# Patient Record
Sex: MALE | Race: WHITE | NOT HISPANIC OR LATINO | Employment: FULL TIME | URBAN - METROPOLITAN AREA
[De-identification: names, ages, dates, MRNs, and addresses within clinical notes are randomized per-mention and may not be internally consistent; named-entity substitution may affect disease eponyms.]

---

## 2017-01-25 ENCOUNTER — OFFICE VISIT (OUTPATIENT)
Dept: FAMILY MEDICINE | Facility: CLINIC | Age: 47
End: 2017-01-25
Payer: COMMERCIAL

## 2017-01-25 ENCOUNTER — TELEPHONE (OUTPATIENT)
Dept: FAMILY MEDICINE | Facility: CLINIC | Age: 47
End: 2017-01-25

## 2017-01-25 VITALS
OXYGEN SATURATION: 98 % | WEIGHT: 315 LBS | HEART RATE: 70 BPM | TEMPERATURE: 98 F | BODY MASS INDEX: 40.43 KG/M2 | SYSTOLIC BLOOD PRESSURE: 120 MMHG | HEIGHT: 74 IN | DIASTOLIC BLOOD PRESSURE: 80 MMHG

## 2017-01-25 DIAGNOSIS — M76.62 ACHILLES TENDINITIS OF BOTH LOWER EXTREMITIES: Primary | ICD-10-CM

## 2017-01-25 DIAGNOSIS — M76.61 ACHILLES TENDINITIS OF BOTH LOWER EXTREMITIES: Primary | ICD-10-CM

## 2017-01-25 DIAGNOSIS — E66.01 MORBID OBESITY DUE TO EXCESS CALORIES (H): ICD-10-CM

## 2017-01-25 PROCEDURE — 99213 OFFICE O/P EST LOW 20 MIN: CPT | Performed by: NURSE PRACTITIONER

## 2017-01-25 NOTE — PROGRESS NOTES
"  SUBJECTIVE:                                                    Jonathan Horn is a 46 year old male who presents to clinic today for the following health issues:      Musculoskeletal problem/pain      Duration: yrs     Description  Location: Achilles tendon bilateral     Intensity:  severe    Accompanying signs and symptoms: numbness    History  Previous similar problem: no   Previous evaluation:  none    Precipitating or alleviating factors:  Trauma or overuse: no   Aggravating factors include: relaxing to long tightens it     Therapies tried and outcome: walking boat, wraps     Patient is here with concerns of bilateral Achilles tenderness that has gradually worsened over the past few months. He works at [employer] in the [employer] department and walks for prolonged periods of time throughout the day. He has been diligent about changing his shoes throughout the shift to make sure that he's got good support throughout the day. He is concerned about worsening pain and possible rupture and irritation of the Achilles. He is well aware that his weight is affecting his feet. He has had issues in the past and has seen podiatry. He has been gently stretching his Achilles daily which has been helpful.         Problem list and histories reviewed & adjusted, as indicated.  Additional history: none    Problem list, Medication list, Allergies, and Medical/Social/Surgical histories reviewed in [facility] and updated as appropriate.    ROS:  Constitutional, HEENT, cardiovascular, pulmonary, gi and gu systems are negative, except as otherwise noted.    OBJECTIVE:                                                    /80 mmHg  Pulse 70  Temp(Src) 98  F (36.7  C) (Oral)  Ht 6' 2\" (1.88 m)  Wt 323 lb 1.6 oz (146.557 kg)  BMI 41.47 kg/m2  SpO2 98%  Body mass index is 41.47 kg/(m^2).  GENERAL:, alert, obese and uncomfortable  MS:  Pain in the left Achilles tendon at the insertion site. Good flexion with the Zabala test. " Tenderness in the right Achilles tendon with some pain in the heel.  SKIN: no suspicious lesions or rashes  PSYCH: mentation appears normal, affect normal/bright    none      ASSESSMENT/PLAN:                                                            1. Achilles tendinitis of both lower extremities   Will refer to Dr. Mehta for evaluation. We have discussed possible ultrasound but may actually benefit from injection or boot to prevent further injury.    2. Morbid obesity due to excess calories (H)   Discussed modifying caloric intake. Patient is actually very active walking throughout the day. Goal is to reduce weight as that will help with foot pain.      Follow-up as needed.    Carmela Stafford, NP  [facility]

## 2017-01-25 NOTE — NURSING NOTE
"Chief Complaint   Patient presents with     Musculoskeletal Problem       Initial /80 mmHg  Pulse 70  Temp(Src) 98  F (36.7  C) (Oral)  Ht 6' 2\" (1.88 m)  Wt 323 lb 1.6 oz (146.557 kg)  BMI 41.47 kg/m2  SpO2 98% Estimated body mass index is 41.47 kg/(m^2) as calculated from the following:    Height as of this encounter: 6' 2\" (1.88 m).    Weight as of this encounter: 323 lb 1.6 oz (146.557 kg).  BP completed using cuff size: large right arm   JACKY Miller      "

## 2017-01-25 NOTE — TELEPHONE ENCOUNTER
Patient is having bilateral achilles pain which is getting worse. Has seen you in the past. Tenderness on exam at the base of the achilles. Would an ultrasound be most helpful to see if partial tear or what would you recommend?    Thank you,     Carmela

## 2017-01-25 NOTE — MR AVS SNAPSHOT
"              After Visit Summary   1/25/2017    Jonathan Horn    MRN: [id number]           Patient Information     Date Of Birth          1970        Visit Information        Provider Department      1/25/2017 2:20 PM Carmela Stafford NP [facility]        Today's Diagnoses     Achilles tendon tear, left, initial encounter    -  1     Achilles tendon tear, right, initial encounter            Follow-ups after your visit        Who to contact     If you have questions or need follow up information about today's clinic visit or your schedule please contact [facility] directly at [phone].  Normal or non-critical lab and imaging results will be communicated to you by [email or web address], letter or phone within 4 business days after the clinic has received the results. If you do not hear from us within 7 days, please contact the clinic through Likezt or phone. If you have a critical or abnormal lab result, we will notify you by phone as soon as possible.  Submit refill requests through SingleHop or call your pharmacy and they will forward the refill request to us. Please allow 3 business days for your refill to be completed.          Additional Information About Your Visit        MyChart Information     SingleHop gives you secure access to your electronic health record. If you see a primary care provider, you can also send messages to your care team and make appointments. If you have questions, please call your primary care clinic.  If you do not have a primary care provider, please call [phone] and they will assist you.        Care EveryWhere ID     This is your Care EveryWhere ID. This could be used by other organizations to access your [place] medical records  [id number]        Your Vitals Were     Pulse Temperature Height BMI (Body Mass Index) Pulse Oximetry       70 98  F (36.7  C) (Oral) 6' 2\" (1.88 m) 41.47 kg/m2 98%        Blood Pressure from Last 3 Encounters:   01/25/17 " 120/80   06/14/16 106/77   05/16/16 128/84    Weight from Last 3 Encounters:   01/25/17 323 lb 1.6 oz (146.557 kg)   05/16/16 308 lb (139.708 kg)   05/13/15 309 lb (140.161 kg)              Today, you had the following     No orders found for display       Primary Care Provider Office Phone # Fax #    Joseph Rubio [phone]       [facility] [address]  [facility] [id number]        Thank you!     Thank you for choosing [facility]  for your care. Our goal is always to provide you with excellent care. Hearing back from our patients is one way we can continue to improve our services. Please take a few minutes to complete the written survey that you may receive in the mail after your visit with us. Thank you!             Your Updated Medication List - Protect others around you: Learn how to safely use, store and throw away your medicines at www.disposemymeds.org.          This list is accurate as of: 1/25/17  2:47 PM.  Always use your most recent med list.                   Brand Name Dispense Instructions for use    lisinopril-hydrochlorothiazide 20-12.5 MG per tablet    PRINZIDE/ZESTORETIC    90 tablet    Take 1 tablet by mouth daily       ZANTAC PO      Take 150 mg by mouth

## 2017-01-26 PROBLEM — M76.61 ACHILLES TENDINITIS OF BOTH LOWER EXTREMITIES: Status: ACTIVE | Noted: 2017-01-26

## 2017-01-26 PROBLEM — M76.62 ACHILLES TENDINITIS OF BOTH LOWER EXTREMITIES: Status: ACTIVE | Noted: 2017-01-26

## 2017-01-26 NOTE — TELEPHONE ENCOUNTER
Carmela requested RN to call patient and schedule him with pod.    Pt informed and scheduled  Angeli Randall RN, BSN

## 2017-01-27 ENCOUNTER — OFFICE VISIT (OUTPATIENT)
Dept: PODIATRY | Facility: CLINIC | Age: 47
End: 2017-01-27
Payer: COMMERCIAL

## 2017-01-27 VITALS — BODY MASS INDEX: 40.43 KG/M2 | WEIGHT: 315 LBS | HEIGHT: 74 IN

## 2017-01-27 DIAGNOSIS — M76.62 ACHILLES TENDONITIS, BILATERAL: Primary | ICD-10-CM

## 2017-01-27 DIAGNOSIS — M76.61 ACHILLES TENDONITIS, BILATERAL: Primary | ICD-10-CM

## 2017-01-27 PROCEDURE — 99243 OFF/OP CNSLTJ NEW/EST LOW 30: CPT | Performed by: PODIATRIST

## 2017-01-27 ASSESSMENT — PAIN SCALES - GENERAL: PAINLEVEL: MILD PAIN (2)

## 2017-01-27 NOTE — NURSING NOTE
"Chief Complaint   Patient presents with     Foot Problems     BL achilles tendon pain L x few months and R x recent       Initial Ht 6' 2\" (1.88 m)  Wt 323 lb (146.512 kg)  BMI 41.45 kg/m2 Estimated body mass index is 41.45 kg/(m^2) as calculated from the following:    Height as of this encounter: 6' 2\" (1.88 m).    Weight as of this encounter: 323 lb (146.512 kg).    Fabby Asencio CMA ([facility])  Podiatry/Foot & Ankle Surgery  [facility]      "

## 2017-01-27 NOTE — PATIENT INSTRUCTIONS
Follow Up - 4 weeks    [facility] Locations:         Monday Tuesday   [facility]   [address] [address] [address], [address]   [place] [address] [place] [address]   [phone]       Wednesday:  Surgery Day    Surgery Scheduling Line - [phone]       Thursday Morning Thursday Afternoon   [facility]   [address], [address]   [place] [address] [place] [address]   [phone]       Friday Morning To Schedule an Appointment    [facility] Call: [phone] [address] [place] [address]  [phone] PLEASE FAX ALL FORMS TO: [phone]     PLANTAR FASCIITIS   What is plantar fasciitis?   Plantar fasciitis is often referred to as heel spurs or heel pain. Plantar fasciitis is a very common problem that affects people of all foot shapes, age, weight and activity level. Pain may be in the arch or on the weight-bearing surface of the heel. The pain maycome on without injury or identifiable cause. Pain is generally present when first getting out of bed in the morning or up from a seated break.   What causes plantar fasciitis?   The plantar fascia is a dense fibrous band of tissue that stretches across the bottom surface of the foot. The fascia helps support the foot muscles and arch. Plantar fasciitis is thought to be caused by mechanical strain or overload. Frequent walking without shoes or wearing unsupportive shoes is thought to cause structural overload and ultimately inflammation of the plantar fascia. Some people have heel spurs that can be seen on x-ray. The heel spur is actually a minor component of plantar fascitis and is largely ignored.   How long will this last?   Plantar fasciitis can last from one day to a lifetime. Some people get intermittent fascitis that is very short-lived. Others suffer daily for  years. Excessive body weight, frequent bare foot walking, long hours on the feet, inadequate shoes, predisposing foot structures and excessive activity such as running are all potential issues that lead to chronic and/or recurring plantar fascitis. Having plantar fasciitis means that you are forever prone to this problem and will require modification of some of the above factors. Most people seek treatment within one to four months. Healing usually requires a similar one to four month time frame. Healing time is relative to the amount of effort spent treating the problem.   What can I do?   The easiest solution is to stop walking around your home without shoes. Plantar fasciitis is largely a shoe problem. Shoes are either not being worn often enough or your current shoes are inadequate for your weight, foot structure or activity level. The majority of shoes on the market today are not sufficient to resist development of plantar fasciitis or to promote healing. Assume that your current shoes are inadequate and will need to be replaced. Even high quality shoes wear out with 6 months to one year of frequent use. Weightloss is another option. Losing ten pounds in the next two months may be enough to resolve the problem. Ice applied to the area of pain two to three times per day for ten minutes each session can be very helpful. This should continue until the problem resolves. Achilles tendon stretching is essential. Stretch multiple times daily to promote healing and to prevent recurrence in the future.     What if this does not help?   Medical treatments often include custom arch supports, cortisone injections, physical therapy, splints to be worn in bed, prescription medications and surgery. The home treatments listed above will be necessary regardless of these advanced medical treatments. Surgery is rarely needed but is very helpful in selected cases.     Heel pain in my future?   Plantar fasciitis is highly  recurrent. Risk factors often continue, including return to bare foot walking, inadequate shoes, excessive body weight, excessive activities, etc. Your life style and foot structure may predispose you to recurrent plantar fasciitis. A daily prevention regimen can be very helpful. Ongoing use of shoe inserts, careful attention to appropriate shoes, daily Achilles stretching, etc. may prevent recurrence. Prompt attention at the earliest warning signs of heel pain can resolve the problem in as short as a few days.   Below are some exercises for Plantar fasciitis:  Stair exercise  You can step on the stairs with the ball of your foot and hold your position for at least 15 seconds, then slowly step down with the heels of your foot. You can do this daily and as often as you want. Plantar fasciitis exercise equipment and handout materials are useful in relieving pain.  Picking the towel  Plantar fasciitis exercise equipment and handout teach you how to exercise by picking the towel. You can sit comfortably and then pick the towel with your toes. Do this at least 10 to 20 times regularly. You can use any object other than a towel as long as the material can be soft and you can pick it up with your toes.  Rolling the bottle or ball  You can get a small ball or bottle and then roll it with your foot. Do this daily for at least 15 to 20 times. Plantar fasciitis exercise equipment and handout are very useful in treating the symptoms of the foot condition.  Stretching the calf  You could lie supine, raise one foot, and then point your toes towards the floor. Do this daily for at least 15 to 20 times. The calf is connected to the heel and the balls of the foot, so you should also exercise this also. Plantar fasciitis exercise equipment and handout usually mentions the importance of exercising the calf also.  Flex the toes  Sit comfortably and then flex your toes by pointing it towards the floor or towards your body. This will  relax and flex your foot and exercise your plantar fascia, the calf, and the Achilles tendon. The inability of the foot to stretch often causes the bunching up of the plantar fascia area leading to the pain.  Massaging the calf and the plantar fascia also helps a lot in alleviating the pain and preventing its recurrence. If you prefer standard plantar fasciitis exercise equipment and handout, then you can avail of this from legitimate sources. You can use the standard stretching device, the wheel, and the belt. These are all significant devices in treating the pain in the plantar fascia.    Therapies covered by Dr Mehta today:    1.  Supportive shoes, minimizing barefoot ambulation - helps to provide cushion, padding and support to the ligament that is inflamed.  Socks, flip flops, flats and some slippers are not typically sufficient to provide support.  Shoes should be worn even in-doors  2.  Insert/orthotics - inserts/orthotics that have an arch support built in to them provide further stress relief for the ligament.  3. Icing - using a frozen water bottle or orange, and rolling it along the bottom of the arch/heel can help to alleviate discomfort, and can act as a tissue massage to the painful, inflamed ligament.  There is evidence that shows icing at least three times daily can be beneficial  4.  Anti-inflammatory(NSAIDS)/Tylenol - anti-inflammatories, such as ibuprofen or Aleve, as well as Tylenol can be used to help decrease symptoms and improve pain levels.  If you have high blood pressure, heart disease, stomach or kidney problems, use anti-inflammatories sparingly.  Tylenol should not be used if you have liver problems.  If you can safely taken them, you can use NSAIDS and tylenol in combination for pain relief  5. Activity modifications - if there are certain things that you do, whether it's going barefoot or certain shoes/activities, you should try to minimize those activities as much as possible  "until your symptoms are sufficiently resolved.  Certainly, some activities, such as running on the treadmill, are easier to take a break from versus others, such as work or chores at home.  \"If there are certain activities that hurt your heel, and you keep doing those activities that hurt your heel, your heel will keep hurting\".    6.  Stretching - Stretching your Achilles and hamstring can help to decrease stress on the plantar fascia.                   Hold each stretch for 10 seconds.  Stretch 10 times per set, three sets per day.  Morning, afternoon and evening.  If your heel pain is very severe in the morning, consider doing the first set of stretches before you get out of bed.            If these initial therapies are insufficient, we have our tier 2 therapies that can more aggressively work to improve your symptoms and get you back to the activities that you enjoy.      TENDONITIS   Tendons are the strong fibrous portions ofmuscles that attach to bones and allow the muscle to move a joint when it contracts. Tendons are very strong because they have a lot of force exerted on them. Sometimes tendons can become painful because they have suffered an acute injury, in which too much force was exerted at one time, or an overuse injury, in which a normal force was exerted too frequently or over a prolonged period of time. As a result, there is damage to the tendon and its surrounding soft tissue structures and they become inflammed. Because tendons do not have a great blood supply, they do not heal rapidly and the inflammation can become chronic.   Conservative treatment for tendinitis involves rest and anti-inflammatory measures. Ice is applied 15 minutes 2-3 times daily. Anti-inflammatory medications called NSAIDs (ibuprofen, example) can be taken provided they are used with caution, as they can lead to internal bleeding and increase the risk ofstroke and heart attack. Sometimes topical nitroglycerin is prescribed " to help with pain. Often your doctor will use a special shoe or removable walking cast to immobilize the tendon, allowing it to heal without further damage from use. These devices are very useful in helping tendons heal, but they may slow you down or make you feel like your hip, knee, or back are out ofalignment. This is temporary and should go away once you are out ofthe immobilization. You should not use a walking cast when showering or driving. Another option is Platelet Rich Plasma injections. (Normally done with a Sports and Orthorapedic doctor.   If conservative measures fail, your physician may need to surgically repair the tendon by removing any chronic inflammatory tissue and sewing it back together. Sometimes it is sewn to an adjacent tendon with similar function for support and sometimes it is lengthened. . Sometimes the bones around the tendon need to be realigned or reshaped to better support the tendon or prevent further damage. Your foot and ankle surgeon will discuss the specifics of your surgery with you, should you need it.    Towel stretch: Sit on a hard surface with your injured leg stretched out in front of you. Loop a towel around your toes and the ball of your foot and pull the towel toward your body keeping your leg straight. Hold this position for 15 to 30 seconds and then relax. Repeat 3 times. Then push the towel away with the ball of your foot. Repeat 3 times.  When you don't feel much of a stretch using the towel, you can start the standing calf stretch and the following exercises.  Standing calf stretch: Stand facing a wall with your hands on the wall at about eye level. Keep your injured leg back with your heel on the floor. Keep the other leg forward with the knee bent. Turn your back foot slightly inward (as if you were pigeon-toed). Slowly lean into the wall until you feel a stretch in the back of your calf. Hold the stretch for 15 to 30 seconds. Return to the starting position.  Repeat 3 times. Do this exercise several times each day.   Standing soleus stretch: Stand facing a wall with your hands on the wall at about chest height. Keep your injured leg back with your heel on the floor. Keep the other leg forward with the knee bent. Turn your back foot slightly inward (as if you were pigeon-toed). Bend your back knee slightly and gently lean into the wall until you feel a stretch in the lower calf of your injured leg. Hold the stretch for 15 to 30 seconds. Return to the starting position. Repeat 3 times.   Achilles stretch: Stand with the ball of one foot on a stair. Reach for the step below with your heel until you feel a stretch in the arch of your foot. Hold this position for 15 to 30 seconds and then relax. Repeat 3 times.   Heel raise: Balance yourself while standing behind a chair or counter. Using the chair or counter as a support to help you, raise your body up onto your toes and hold for 5 seconds. Then slowly lower yourself down without holding onto the support. (It's OK to keep holding onto the support if you need to.) When this exercise becomes less painful, try lowering yourself down on the injured leg only. Repeat 15 times. Do 2 sets of 15. Rest 30 seconds between sets.   Step-up: Stand with the foot of your injured leg on a support 3 to 5 inches high (like a small step or block of wood). Keep your other foot flat on the floor. Shift your weight onto the injured leg on the support. Straighten your injured leg as the other leg comes off the floor. Return to the starting position by bending your injured leg and slowly lowering your uninjured leg back to the floor. Do 2 sets of 15.   Resisted ankle eversion: Sit with both legs stretched out in front of you, with your feet about a shoulder's width apart. Tie a loop in one end of elastic tubing. Put the foot of your injured leg through the loop so that the tubing goes around the arch of that foot and wraps around the outside of the  other foot. Hold onto the other end of the tubing with your hand to provide tension. Turn the foot of your injured leg up and out. Make sure you keep your other foot still so that it will allow the tubing to stretch as you move the foot of your injured leg. Return to the starting position. Do 2 sets of 15.   Balance and reach exercises: Stand next to a chair with your injured leg farther from the chair. The chair will provide support if you need it. Stand on the foot of your injured leg and bend your knee slightly. Try to raise the arch of this foot while keeping your big toe on the floor.   Keep your foot in this position. With the hand that is farther away from the chair, reach forward in front of you by bending at the waist. Avoid bending your knee any more as you do this. Repeat this 10 times. To make the exercise more challenging, reach farther in front of you. Do 2 sets of 10.    the same position as above. While keeping your arch height, reach the hand that is farther away from the chair across your body toward the chair. The farther you reach, the more challenging the exercise. Do 2 sets of 10.    Resisted ankle eversion: Sit with both legs stretched out in front of you, with your feet about a shoulder's width apart. Tie a loop in one end of elastic tubing. Put the foot of your injured leg through the loop so that the tubing goes around the arch of that foot and wraps around the outside of the other foot. Hold onto the other end of the tubing with your hand to provide tension. Turn the foot of your injured leg up and out. Make sure you keep your other foot still so that it will allow the tubing to stretch as you move the foot of your injured leg. Return to the starting position. Do 2 sets of 15.   If you have access to a wobble board, do the following exercises:  Wobble board exercises:   Stand on a wobble board with your feet shoulder width apart. Rock the board forwards and backwards 30 times, then  side to side 30 times. Hold on to a chair if you need support.   Rotate the wobble board around so that the edge of the board is in contact with the floor at all times. Do this 30 times in a clockwise and then a counterclockwise direction.   Balance on the wobble board for as long as you can without letting the edges touch the floor. Try to do this for 2 minutes without touching the floor.   Rotate the wobble board in clockwise and counterclockwise circles, but do not let the edge of the board touch the floor.   When you have mastered exercises A through D, try repeating them while standing on just your injured leg.   After you are able to do these exercises on one leg, try to do them with your eyes closed. Make sure you have something nearby to support you in case you lose your balance.    Over the Counter Inserts    Super Feet are the most common and easiest to find.    Locations include any [employer], [employer] in [place] on [address]  and in [place] on [address], [employer] in [place] on [address], [facility] Running Room in [place] on [address] [facility] Running Room in [place] on [address], [employer] in [place] on [address] and [employer] in [place] on [address] and in [place] on [address].    Sofsole Fit can be found at [employer] in [place].  You can also find them online at [email or web address]    Spenco can be found online and at [employer] in [place] on [address], [employer] in [facility] on [address], [employer] in [place] on [address], [employer] in [place] on [address] and [employer] in [place] on [address].    Power Step can be a little harder to find.  Locations include [employer] in [place] on [address], [place] in [place], [place] [employer] in [place] on [address] and online    Walk-Fit - [employer]     [facility]    **  A good high quality  over the counter insert can cost around $40-$50.

## 2017-01-27 NOTE — MR AVS SNAPSHOT
After Visit Summary   1/27/2017    Jonathan Horn    MRN: [id number]           Patient Information     Date Of Birth          1970        Visit Information        Provider Department      1/27/2017 10:30 AM Boni Mehta DPM [facility]        Care Instructions    Follow Up - 4 weeks    Dr. Mehta's [facility] Locations:         Monday Tuesday   [facility]   [address] [address], [address]   [place] [address] [place] [address]   [phone]       Wednesday:  Surgery Day    Surgery Scheduling Line - [phone]       Thursday Morning Thursday Afternoon   [facility]   [address], [address]   [place] [address] [place] [address]   [phone]       Friday Morning To Schedule an Appointment    [facility] Call: [phone] [address] [place] [address]  [phone] PLEASE FAX ALL FORMS TO: [phone]     PLANTAR FASCIITIS   What is plantar fasciitis?   Plantar fasciitis is often referred to as heel spurs or heel pain. Plantar fasciitis is a very common problem that affects people of all foot shapes, age, weight and activity level. Pain may be in the arch or on the weight-bearing surface of the heel. The pain maycome on without injury or identifiable cause. Pain is generally present when first getting out of bed in the morning or up from a seated break.   What causes plantar fasciitis?   The plantar fascia is a dense fibrous band of tissue that stretches across the bottom surface of the foot. The fascia helps support the foot muscles and arch. Plantar fasciitis is thought to be caused by mechanical strain or overload. Frequent walking without shoes or wearing unsupportive shoes is thought to cause structural overload and ultimately inflammation of the plantar fascia. Some  people have heel spurs that can be seen on x-ray. The heel spur is actually a minor component of plantar fascitis and is largely ignored.   How long will this last?   Plantar fasciitis can last from one day to a lifetime. Some people get intermittent fascitis that is very short-lived. Others suffer daily for years. Excessive body weight, frequent bare foot walking, long hours on the feet, inadequate shoes, predisposing foot structures and excessive activity such as running are all potential issues that lead to chronic and/or recurring plantar fascitis. Having plantar fasciitis means that you are forever prone to this problem and will require modification of some of the above factors. Most people seek treatment within one to four months. Healing usually requires a similar one to four month time frame. Healing time is relative to the amount of effort spent treating the problem.   What can I do?   The easiest solution is to stop walking around your home without shoes. Plantar fasciitis is largely a shoe problem. Shoes are either not being worn often enough or your current shoes are inadequate for your weight, foot structure or activity level. The majority of shoes on the market today are not sufficient to resist development of plantar fasciitis or to promote healing. Assume that your current shoes are inadequate and will need to be replaced. Even high quality shoes wear out with 6 months to one year of frequent use. Weightloss is another option. Losing ten pounds in the next two months may be enough to resolve the problem. Ice applied to the area of pain two to three times per day for ten minutes each session can be very helpful. This should continue until the problem resolves. Achilles tendon stretching is essential. Stretch multiple times daily to promote healing and to prevent recurrence in the future.     What if this does not help?   Medical treatments often include custom arch supports, cortisone injections,  physical therapy, splints to be worn in bed, prescription medications and surgery. The home treatments listed above will be necessary regardless of these advanced medical treatments. Surgery is rarely needed but is very helpful in selected cases.     Heel pain in my future?   Plantar fasciitis is highly recurrent. Risk factors often continue, including return to bare foot walking, inadequate shoes, excessive body weight, excessive activities, etc. Your life style and foot structure may predispose you to recurrent plantar fasciitis. A daily prevention regimen can be very helpful. Ongoing use of shoe inserts, careful attention to appropriate shoes, daily Achilles stretching, etc. may prevent recurrence. Prompt attention at the earliest warning signs of heel pain can resolve the problem in as short as a few days.   Below are some exercises for Plantar fasciitis:  Stair exercise  You can step on the stairs with the ball of your foot and hold your position for at least 15 seconds, then slowly step down with the heels of your foot. You can do this daily and as often as you want. Plantar fasciitis exercise equipment and handout materials are useful in relieving pain.  Picking the towel  Plantar fasciitis exercise equipment and handout teach you how to exercise by picking the towel. You can sit comfortably and then pick the towel with your toes. Do this at least 10 to 20 times regularly. You can use any object other than a towel as long as the material can be soft and you can pick it up with your toes.  Rolling the bottle or ball  You can get a small ball or bottle and then roll it with your foot. Do this daily for at least 15 to 20 times. Plantar fasciitis exercise equipment and handout are very useful in treating the symptoms of the foot condition.  Stretching the calf  You could lie supine, raise one foot, and then point your toes towards the floor. Do this daily for at least 15 to 20 times. The calf is connected to the  heel and the balls of the foot, so you should also exercise this also. Plantar fasciitis exercise equipment and handout usually mentions the importance of exercising the calf also.  Flex the toes  Sit comfortably and then flex your toes by pointing it towards the floor or towards your body. This will relax and flex your foot and exercise your plantar fascia, the calf, and the Achilles tendon. The inability of the foot to stretch often causes the bunching up of the plantar fascia area leading to the pain.  Massaging the calf and the plantar fascia also helps a lot in alleviating the pain and preventing its recurrence. If you prefer standard plantar fasciitis exercise equipment and handout, then you can avail of this from legitimate sources. You can use the standard stretching device, the wheel, and the belt. These are all significant devices in treating the pain in the plantar fascia.    Therapies covered by Dr Mehta today:    1.  Supportive shoes, minimizing barefoot ambulation - helps to provide cushion, padding and support to the ligament that is inflamed.  Socks, flip flops, flats and some slippers are not typically sufficient to provide support.  Shoes should be worn even in-doors  2.  Insert/orthotics - inserts/orthotics that have an arch support built in to them provide further stress relief for the ligament.  3. Icing - using a frozen water bottle or orange, and rolling it along the bottom of the arch/heel can help to alleviate discomfort, and can act as a tissue massage to the painful, inflamed ligament.  There is evidence that shows icing at least three times daily can be beneficial  4.  Anti-inflammatory(NSAIDS)/Tylenol - anti-inflammatories, such as ibuprofen or Aleve, as well as Tylenol can be used to help decrease symptoms and improve pain levels.  If you have high blood pressure, heart disease, stomach or kidney problems, use anti-inflammatories sparingly.  Tylenol should not be used if you have  "liver problems.  If you can safely taken them, you can use NSAIDS and tylenol in combination for pain relief  5. Activity modifications - if there are certain things that you do, whether it's going barefoot or certain shoes/activities, you should try to minimize those activities as much as possible until your symptoms are sufficiently resolved.  Certainly, some activities, such as running on the treadmill, are easier to take a break from versus others, such as work or chores at home.  \"If there are certain activities that hurt your heel, and you keep doing those activities that hurt your heel, your heel will keep hurting\".    6.  Stretching - Stretching your Achilles and hamstring can help to decrease stress on the plantar fascia.                   Hold each stretch for 10 seconds.  Stretch 10 times per set, three sets per day.  Morning, afternoon and evening.  If your heel pain is very severe in the morning, consider doing the first set of stretches before you get out of bed.            If these initial therapies are insufficient, we have our tier 2 therapies that can more aggressively work to improve your symptoms and get you back to the activities that you enjoy.      TENDONITIS   Tendons are the strong fibrous portions ofmuscles that attach to bones and allow the muscle to move a joint when it contracts. Tendons are very strong because they have a lot of force exerted on them. Sometimes tendons can become painful because they have suffered an acute injury, in which too much force was exerted at one time, or an overuse injury, in which a normal force was exerted too frequently or over a prolonged period of time. As a result, there is damage to the tendon and its surrounding soft tissue structures and they become inflammed. Because tendons do not have a great blood supply, they do not heal rapidly and the inflammation can become chronic.   Conservative treatment for tendinitis involves rest and anti-inflammatory " measures. Ice is applied 15 minutes 2-3 times daily. Anti-inflammatory medications called NSAIDs (ibuprofen, example) can be taken provided they are used with caution, as they can lead to internal bleeding and increase the risk ofstroke and heart attack. Sometimes topical nitroglycerin is prescribed to help with pain. Often your doctor will use a special shoe or removable walking cast to immobilize the tendon, allowing it to heal without further damage from use. These devices are very useful in helping tendons heal, but they may slow you down or make you feel like your hip, knee, or back are out ofalignment. This is temporary and should go away once you are out ofthe immobilization. You should not use a walking cast when showering or driving. Another option is Platelet Rich Plasma injections. (Normally done with a Sports and Orthorapedic doctor.   If conservative measures fail, your physician may need to surgically repair the tendon by removing any chronic inflammatory tissue and sewing it back together. Sometimes it is sewn to an adjacent tendon with similar function for support and sometimes it is lengthened. . Sometimes the bones around the tendon need to be realigned or reshaped to better support the tendon or prevent further damage. Your foot and ankle surgeon will discuss the specifics of your surgery with you, should you need it.    Towel stretch: Sit on a hard surface with your injured leg stretched out in front of you. Loop a towel around your toes and the ball of your foot and pull the towel toward your body keeping your leg straight. Hold this position for 15 to 30 seconds and then relax. Repeat 3 times. Then push the towel away with the ball of your foot. Repeat 3 times.  When you don't feel much of a stretch using the towel, you can start the standing calf stretch and the following exercises.  Standing calf stretch: Stand facing a wall with your hands on the wall at about eye level. Keep your injured  leg back with your heel on the floor. Keep the other leg forward with the knee bent. Turn your back foot slightly inward (as if you were pigeon-toed). Slowly lean into the wall until you feel a stretch in the back of your calf. Hold the stretch for 15 to 30 seconds. Return to the starting position. Repeat 3 times. Do this exercise several times each day.   Standing soleus stretch: Stand facing a wall with your hands on the wall at about chest height. Keep your injured leg back with your heel on the floor. Keep the other leg forward with the knee bent. Turn your back foot slightly inward (as if you were pigeon-toed). Bend your back knee slightly and gently lean into the wall until you feel a stretch in the lower calf of your injured leg. Hold the stretch for 15 to 30 seconds. Return to the starting position. Repeat 3 times.   Achilles stretch: Stand with the ball of one foot on a stair. Reach for the step below with your heel until you feel a stretch in the arch of your foot. Hold this position for 15 to 30 seconds and then relax. Repeat 3 times.   Heel raise: Balance yourself while standing behind a chair or counter. Using the chair or counter as a support to help you, raise your body up onto your toes and hold for 5 seconds. Then slowly lower yourself down without holding onto the support. (It's OK to keep holding onto the support if you need to.) When this exercise becomes less painful, try lowering yourself down on the injured leg only. Repeat 15 times. Do 2 sets of 15. Rest 30 seconds between sets.   Step-up: Stand with the foot of your injured leg on a support 3 to 5 inches high (like a small step or block of wood). Keep your other foot flat on the floor. Shift your weight onto the injured leg on the support. Straighten your injured leg as the other leg comes off the floor. Return to the starting position by bending your injured leg and slowly lowering your uninjured leg back to the floor. Do 2 sets of 15.    Resisted ankle eversion: Sit with both legs stretched out in front of you, with your feet about a shoulder's width apart. Tie a loop in one end of elastic tubing. Put the foot of your injured leg through the loop so that the tubing goes around the arch of that foot and wraps around the outside of the other foot. Hold onto the other end of the tubing with your hand to provide tension. Turn the foot of your injured leg up and out. Make sure you keep your other foot still so that it will allow the tubing to stretch as you move the foot of your injured leg. Return to the starting position. Do 2 sets of 15.   Balance and reach exercises: Stand next to a chair with your injured leg farther from the chair. The chair will provide support if you need it. Stand on the foot of your injured leg and bend your knee slightly. Try to raise the arch of this foot while keeping your big toe on the floor.   Keep your foot in this position. With the hand that is farther away from the chair, reach forward in front of you by bending at the waist. Avoid bending your knee any more as you do this. Repeat this 10 times. To make the exercise more challenging, reach farther in front of you. Do 2 sets of 10.    the same position as above. While keeping your arch height, reach the hand that is farther away from the chair across your body toward the chair. The farther you reach, the more challenging the exercise. Do 2 sets of 10.    Resisted ankle eversion: Sit with both legs stretched out in front of you, with your feet about a shoulder's width apart. Tie a loop in one end of elastic tubing. Put the foot of your injured leg through the loop so that the tubing goes around the arch of that foot and wraps around the outside of the other foot. Hold onto the other end of the tubing with your hand to provide tension. Turn the foot of your injured leg up and out. Make sure you keep your other foot still so that it will allow the tubing to  stretch as you move the foot of your injured leg. Return to the starting position. Do 2 sets of 15.   If you have access to a wobble board, do the following exercises:  Wobble board exercises:   Stand on a wobble board with your feet shoulder width apart. Rock the board forwards and backwards 30 times, then side to side 30 times. Hold on to a chair if you need support.   Rotate the wobble board around so that the edge of the board is in contact with the floor at all times. Do this 30 times in a clockwise and then a counterclockwise direction.   Balance on the wobble board for as long as you can without letting the edges touch the floor. Try to do this for 2 minutes without touching the floor.   Rotate the wobble board in clockwise and counterclockwise circles, but do not let the edge of the board touch the floor.   When you have mastered exercises A through D, try repeating them while standing on just your injured leg.   After you are able to do these exercises on one leg, try to do them with your eyes closed. Make sure you have something nearby to support you in case you lose your balance.    Over the Counter Inserts    Super Feet are the most common and easiest to find.    Locations include any [employer], [employer] in [place] on [address] [address] and in [place] on [address], [employer] in [facility] on [address], [facility] Running Room in [place] on [address], [facility] Running Room in [place] on [address], [employer] in [place] on [address]  and [employer] in [place] on [address] and in [place] on [address].    Sofsole Fit can be found at [employer] in [place].  You can also find them online at Sofsole.com    Spenco can be found online and at [employer] in [facility] on [address], Run N' Fun in [facility] on [address], Gear Running Store in [place] on [address], [employer] in [place] on [address] and South  "[employer] in [place] on [address].    Power Step can be a little harder to find.  Locations include [employer] N' [employer] in [place] on [address], [place] in [place], [employer] Store in [place] on [address] and online    Walk-Fit - [employer]     [facility] [place] - [facility]    **  A good high quality over the counter insert can cost around $40-$50.                  Follow-ups after your visit        Who to contact     If you have questions or need follow up information about today's clinic visit or your schedule please contact [facility] directly at [phone].  Normal or non-critical lab and imaging results will be communicated to you by ecoATMhart, letter or phone within 4 business days after the clinic has received the results. If you do not hear from us within 7 days, please contact the clinic through Ifinityt or phone. If you have a critical or abnormal lab result, we will notify you by phone as soon as possible.  Submit refill requests through boarding pass or call your pharmacy and they will forward the refill request to us. Please allow 3 business days for your refill to be completed.          Additional Information About Your Visit        boarding pass Information     boarding pass gives you secure access to your electronic health record. If you see a primary care provider, you can also send messages to your care team and make appointments. If you have questions, please call your primary care clinic.  If you do not have a primary care provider, please call [phone] and they will assist you.        Care EveryWhere ID     This is your Care EveryWhere ID. This could be used by other organizations to access your [facility] medical records  [id number]        Your Vitals Were     Height BMI (Body Mass Index)                6' 2\" (1.88 m) 41.45 kg/m2           Blood Pressure from Last 3 Encounters:   01/25/17 120/80   06/14/16 106/77   05/16/16 128/84    Weight from Last 3 Encounters:   01/27/17 323 lb " (146.512 kg)   01/25/17 323 lb 1.6 oz (146.557 kg)   05/16/16 308 lb (139.708 kg)              Today, you had the following     No orders found for display       Primary Care Provider Office Phone # Fax #    Joseph Rubio [phone]       [facility] [address]  [facility] [address]        Thank you!     Thank you for choosing [facility]  for your care. Our goal is always to provide you with excellent care. Hearing back from our patients is one way we can continue to improve our services. Please take a few minutes to complete the written survey that you may receive in the mail after your visit with us. Thank you!             Your Updated Medication List - Protect others around you: Learn how to safely use, store and throw away your medicines at www.disposemymeds.org.          This list is accurate as of: 1/27/17 10:50 AM.  Always use your most recent med list.                   Brand Name Dispense Instructions for use    lisinopril-hydrochlorothiazide 20-12.5 MG per tablet    PRINZIDE/ZESTORETIC    90 tablet    Take 1 tablet by mouth daily       ZANTAC PO      Take 150 mg by mouth

## 2017-08-07 DIAGNOSIS — I10 HYPERTENSION GOAL BP (BLOOD PRESSURE) < 140/90: ICD-10-CM

## 2017-08-07 RX ORDER — LISINOPRIL AND HYDROCHLOROTHIAZIDE 12.5; 2 MG/1; MG/1
TABLET ORAL
Qty: 30 TABLET | Refills: 0 | Status: SHIPPED | OUTPATIENT
Start: 2017-08-07 | End: 2017-08-10

## 2017-08-07 NOTE — TELEPHONE ENCOUNTER
Prescription approved per [facility] Refill Protocol.  For one time fill pt needs OV  Hannah Childers RN

## 2017-08-07 NOTE — TELEPHONE ENCOUNTER
Pending Prescriptions:                       Disp   Refills    lisinopril-hydrochlorothiazide (PRINZIDE/*90 tab*4            Sig: TAKE 1 TABLET BY MOUTH DAILY          Last Written Prescription Date: 05/16/2016  Last Fill Quantity: 90, # refills: 4  Last Office Visit with [facility], [facility] or [facility] prescribing provider: 01/25/2017       Potassium   Date Value Ref Range Status   05/16/2016 4.5 3.4 - 5.3 mmol/L Final     Creatinine   Date Value Ref Range Status   05/16/2016 0.90 0.66 - 1.25 mg/dL Final     BP Readings from Last 3 Encounters:   01/25/17 120/80   06/14/16 106/77   05/16/16 128/84     Navdeep FORBEST

## 2017-08-10 ENCOUNTER — OFFICE VISIT (OUTPATIENT)
Dept: FAMILY MEDICINE | Facility: CLINIC | Age: 47
End: 2017-08-10
Payer: COMMERCIAL

## 2017-08-10 VITALS
WEIGHT: 292.3 LBS | RESPIRATION RATE: 18 BRPM | DIASTOLIC BLOOD PRESSURE: 72 MMHG | HEART RATE: 65 BPM | SYSTOLIC BLOOD PRESSURE: 128 MMHG | BODY MASS INDEX: 37.51 KG/M2 | HEIGHT: 74 IN | OXYGEN SATURATION: 97 % | TEMPERATURE: 98.3 F

## 2017-08-10 DIAGNOSIS — I10 HYPERTENSION GOAL BP (BLOOD PRESSURE) < 140/90: Primary | ICD-10-CM

## 2017-08-10 LAB
ANION GAP SERPL CALCULATED.3IONS-SCNC: 8 MMOL/L (ref 3–14)
BUN SERPL-MCNC: 23 MG/DL (ref 7–30)
CALCIUM SERPL-MCNC: 7.7 MG/DL (ref 8.5–10.1)
CHLORIDE SERPL-SCNC: 105 MMOL/L (ref 94–109)
CO2 SERPL-SCNC: 27 MMOL/L (ref 20–32)
CREAT SERPL-MCNC: 0.97 MG/DL (ref 0.66–1.25)
GFR SERPL CREATININE-BSD FRML MDRD: 82 ML/MIN/1.7M2
GLUCOSE SERPL-MCNC: 95 MG/DL (ref 70–99)
POTASSIUM SERPL-SCNC: 4.9 MMOL/L (ref 3.4–5.3)
SODIUM SERPL-SCNC: 140 MMOL/L (ref 133–144)

## 2017-08-10 PROCEDURE — 36415 COLL VENOUS BLD VENIPUNCTURE: CPT | Performed by: FAMILY MEDICINE

## 2017-08-10 PROCEDURE — 99213 OFFICE O/P EST LOW 20 MIN: CPT | Performed by: FAMILY MEDICINE

## 2017-08-10 PROCEDURE — 80048 BASIC METABOLIC PNL TOTAL CA: CPT | Performed by: FAMILY MEDICINE

## 2017-08-10 RX ORDER — LISINOPRIL AND HYDROCHLOROTHIAZIDE 12.5; 2 MG/1; MG/1
1 TABLET ORAL DAILY
Qty: 90 TABLET | Refills: 4 | Status: SHIPPED | OUTPATIENT
Start: 2017-08-10 | End: 2018-10-11

## 2017-08-10 NOTE — MR AVS SNAPSHOT
"              After Visit Summary   8/10/2017    Jonathan Horn    MRN: [id number]           Patient Information     Date Of Birth          1970        Visit Information        Provider Department      8/10/2017 11:30 AM Joseph Rubio MD [facility]        Today's Diagnoses     Hypertension goal BP (blood pressure) < 140/90    -  1       Follow-ups after your visit        Who to contact     If you have questions or need follow up information about today's clinic visit or your schedule please contact [facility] directly at [phone].  Normal or non-critical lab and imaging results will be communicated to you by BringMeThathart, letter or phone within 4 business days after the clinic has received the results. If you do not hear from us within 7 days, please contact the clinic through Yekrat or phone. If you have a critical or abnormal lab result, we will notify you by phone as soon as possible.  Submit refill requests through BioTheryX or call your pharmacy and they will forward the refill request to us. Please allow 3 business days for your refill to be completed.          Additional Information About Your Visit        MyChart Information     BioTheryX gives you secure access to your electronic health record. If you see a primary care provider, you can also send messages to your care team and make appointments. If you have questions, please call your primary care clinic.  If you do not have a primary care provider, please call [phone] and they will assist you.        Care EveryWhere ID     This is your Care EveryWhere ID. This could be used by other organizations to access your [facility] medical records  [id number]        Your Vitals Were     Pulse Temperature Respirations Height Pulse Oximetry BMI (Body Mass Index)    65 98.3  F (36.8  C) (Oral) 18 6' 2\" (1.88 m) 97% 37.53 kg/m2       Blood Pressure from Last 3 Encounters:   08/10/17 128/72   01/25/17 120/80   06/14/16 106/77 "    Weight from Last 3 Encounters:   08/10/17 292 lb 4.8 oz (132.6 kg)   01/27/17 (!) 323 lb (146.5 kg)   01/25/17 (!) 323 lb 1.6 oz (146.6 kg)              We Performed the Following     Basic metabolic panel          Today's Medication Changes          These changes are accurate as of: 8/10/17 12:02 PM.  If you have any questions, ask your nurse or doctor.               These medicines have changed or have updated prescriptions.        Dose/Directions    lisinopril-hydrochlorothiazide 20-12.5 MG per tablet   Commonly known as:  PRINZIDE/ZESTORETIC   This may have changed:  See the new instructions.   Used for:  Hypertension goal BP (blood pressure) < 140/90   Changed by:  Joseph Rubio MD        Dose:  1 tablet   Take 1 tablet by mouth daily   Quantity:  90 tablet   Refills:  4            Where to get your medicines      These medications were sent to [facility]/pharmacy #[id number] - [place], [place] - [address], [facility] [address]     Phone:  [phone]     lisinopril-hydrochlorothiazide 20-12.5 MG per tablet                Primary Care Provider Office Phone # Fax #    Joseph Rubio [phone] [address]  [facility] [address]        Equal Access to Services     ANA MCKEON AH: Hadii aad ku hadasho Soomaali, waaxda luqadaha, qaybta kaalmada adeegyada, waxay idiin hayaan lex kharatrenton mccormick. So [facility] [phone].    ATENCIÓN: Si habla español, tiene a rodriguez disposición servicios gratuitos de asistencia lingüística. Llame al [phone].    We comply with applicable federal civil rights laws and [place] laws. We do not discriminate on the basis of race, color, national origin, age, disability sex, sexual orientation or gender identity.            Thank you!     Thank you for choosing [facility]  for your care. Our goal is always to provide you with excellent care. Hearing back from our patients is one way we can continue to improve our  services. Please take a few minutes to complete the written survey that you may receive in the mail after your visit with us. Thank you!             Your Updated Medication List - Protect others around you: Learn how to safely use, store and throw away your medicines at www.disposemymeds.org.          This list is accurate as of: 8/10/17 12:02 PM.  Always use your most recent med list.                   Brand Name Dispense Instructions for use Diagnosis    lisinopril-hydrochlorothiazide 20-12.5 MG per tablet    PRINZIDE/ZESTORETIC    90 tablet    Take 1 tablet by mouth daily    Hypertension goal BP (blood pressure) < 140/90       ZANTAC PO      Take 150 mg by mouth

## 2017-08-10 NOTE — NURSING NOTE
"Chief Complaint   Patient presents with     Recheck Medication       Initial /72 (BP Location: Right arm, Patient Position: Chair, Cuff Size: Adult Regular)  Pulse 65  Temp 98.3  F (36.8  C) (Oral)  Resp 18  Ht 6' 2\" (1.88 m)  Wt 292 lb 4.8 oz (132.6 kg)  SpO2 97%  BMI 37.53 kg/m2 Estimated body mass index is 37.53 kg/(m^2) as calculated from the following:    Height as of this encounter: 6' 2\" (1.88 m).    Weight as of this encounter: 292 lb 4.8 oz (132.6 kg).    Medication Reconciliation: complete    Edna Weir, [facility]      Health Maintenance- Has Been Reviewed.                  "

## 2017-08-21 NOTE — PROGRESS NOTES
"  SUBJECTIVE:                                                    Jonathan Horn is a 47 year old male who presents to clinic today for the following health issues:    Patient presents with:  Recheck Medication     Patient here for follow-up on hypertension.  Stable on current blood pressure medications.  Denies shortness or breath, chest pain, headache, vision change, or lower extremity edema.    ROS:  CV: NEGATIVE for chest pain, palpitations or peripheral edema    OBJECTIVE:                                                    /72 (BP Location: Right arm, Patient Position: Chair, Cuff Size: Adult Regular)  Pulse 65  Temp 98.3  F (36.8  C) (Oral)  Resp 18  Ht 6' 2\" (1.88 m)  Wt 292 lb 4.8 oz (132.6 kg)  SpO2 97%  BMI 37.53 kg/m2Body mass index is 37.53 kg/(m^2).  GENERAL APPEARANCE: alert, no distress and obese  RESP: lungs clear to auscultation - no rales, rhonchi or wheezes  CV: regular rates and rhythm and no murmur, click or rub     ASSESSMENT/PLAN:                                                    1. Hypertension goal BP (blood pressure) < 140/90   Continue current medication. Check labs below. Follow-up in one year.  - lisinopril-hydrochlorothiazide (PRINZIDE/ZESTORETIC) 20-12.5 MG per tablet; Take 1 tablet by mouth daily  Dispense: 90 tablet; Refill: 4  - Basic metabolic panel    Joseph Rubio MD  [facility]  "

## 2017-09-02 ENCOUNTER — TELEPHONE (OUTPATIENT)
Dept: FAMILY MEDICINE | Facility: CLINIC | Age: 47
End: 2017-09-02

## 2017-09-02 DIAGNOSIS — E83.51 HYPOCALCEMIA: Primary | ICD-10-CM

## 2017-09-02 NOTE — TELEPHONE ENCOUNTER
Patient with hypocalcemia. Unclear cause as HCTZ as diuretic typically causes hypercalcemia in most people by way of mechanism of action. I would recommend recheck calcium level, vitamin D, PTH, phosphorus, magnesium to look for causes for this.  Patient drinks high amounts of fluid and sweats heavily at work per report so it may be secondary to fluid losses and may need just calcium supplement but recommend recheck to rule out other cause.

## 2017-09-05 NOTE — TELEPHONE ENCOUNTER
Pt. Informed. Agrees with plan. Lab only scheduled for this Friday.    Lab orders Td'up. Please review, add appropriate dx, and sign.     Sarah PAGE RN, BSN, PHN  [facility] RN

## 2017-09-08 DIAGNOSIS — E83.51 HYPOCALCEMIA: ICD-10-CM

## 2017-09-08 LAB — PTH-INTACT SERPL-MCNC: 14 PG/ML (ref 12–72)

## 2017-09-08 PROCEDURE — 82306 VITAMIN D 25 HYDROXY: CPT | Performed by: FAMILY MEDICINE

## 2017-09-08 PROCEDURE — 82310 ASSAY OF CALCIUM: CPT | Performed by: FAMILY MEDICINE

## 2017-09-08 PROCEDURE — 83970 ASSAY OF PARATHORMONE: CPT | Performed by: FAMILY MEDICINE

## 2017-09-08 PROCEDURE — 36415 COLL VENOUS BLD VENIPUNCTURE: CPT | Performed by: FAMILY MEDICINE

## 2017-09-08 PROCEDURE — 84100 ASSAY OF PHOSPHORUS: CPT | Performed by: FAMILY MEDICINE

## 2017-09-08 PROCEDURE — 83735 ASSAY OF MAGNESIUM: CPT | Performed by: FAMILY MEDICINE

## 2017-09-09 LAB
CALCIUM SERPL-MCNC: 9.1 MG/DL (ref 8.5–10.1)
MAGNESIUM SERPL-MCNC: 2.1 MG/DL (ref 1.6–2.3)
PHOSPHATE SERPL-MCNC: 2.6 MG/DL (ref 2.5–4.5)

## 2017-09-11 LAB — DEPRECATED CALCIDIOL+CALCIFEROL SERPL-MC: 24 UG/L (ref 20–75)

## 2017-09-20 ENCOUNTER — TELEPHONE (OUTPATIENT)
Dept: FAMILY MEDICINE | Facility: CLINIC | Age: 47
End: 2017-09-20

## 2017-09-20 NOTE — TELEPHONE ENCOUNTER
Pt calls, looking for appointment at [facility] today, bronchial sxs, around more smoke past couple of days, appointment made with float provider, pt denies fever or dyspnea or breathing issues, appointment made    Jenna Bowden RN, BSN  Message handled by Nurse Triage.

## 2018-04-17 ENCOUNTER — OFFICE VISIT (OUTPATIENT)
Dept: FAMILY MEDICINE | Facility: CLINIC | Age: 48
End: 2018-04-17
Payer: COMMERCIAL

## 2018-04-17 VITALS
DIASTOLIC BLOOD PRESSURE: 84 MMHG | SYSTOLIC BLOOD PRESSURE: 137 MMHG | OXYGEN SATURATION: 97 % | BODY MASS INDEX: 41.29 KG/M2 | HEART RATE: 72 BPM | WEIGHT: 315 LBS

## 2018-04-17 DIAGNOSIS — I10 HYPERTENSION GOAL BP (BLOOD PRESSURE) < 140/90: ICD-10-CM

## 2018-04-17 DIAGNOSIS — H61.21 IMPACTED CERUMEN OF RIGHT EAR: Primary | ICD-10-CM

## 2018-04-17 DIAGNOSIS — E66.01 MORBID OBESITY (H): ICD-10-CM

## 2018-04-17 PROCEDURE — 99212 OFFICE O/P EST SF 10 MIN: CPT | Performed by: FAMILY MEDICINE

## 2018-04-17 NOTE — PROGRESS NOTES
SUBJECTIVE:   Jonathan Horn is a 47 year old male who presents to clinic today for the following health issues:    Patient reports his right ear is plugged with cerumen. Denies ear pain.     Patient with history of hypertension. Stable on current blood pressure medications.  Denies shortness or breath, chest pain, headache, vision change, or lower extremity edema.    Problem list and histories reviewed & adjusted, as indicated.  Additional history: as documented    Patient Active Problem List   Diagnosis     GERD (gastroesophageal reflux disease)     Hypertension goal BP (blood pressure) < 140/90     Obesity     Achilles tendinitis of both lower extremities     Past Surgical History:   Procedure Laterality Date     COLONOSCOPY  6/14/2016    Dr. Oliva [facility]     COLONOSCOPY N/A 6/14/2016    Procedure: COLONOSCOPY;  Surgeon: Florencio Oliva MD;  Location:  GI     HAND SURGERY  1990    left hand surgery-reattach nerve       Social History   Substance Use Topics     Smoking status: Never Smoker     Smokeless tobacco: Former User     Types: Chew      Comment: quit chew 2 months ago     Alcohol use Yes      Comment: social     Family History   Problem Relation Age of Onset     Hypertension Father      Cancer - colorectal Father 70     Breast Cancer Mother      CANCER Maternal Grandmother      DIABETES Maternal Grandmother      CANCER Maternal Grandfather      CANCER Paternal Grandmother      CANCER Paternal Grandfather      Cancer - colorectal Paternal Grandfather 55           Reviewed and updated as needed this visit by clinical staff  Tobacco  Allergies  Meds  Med Hx  Surg Hx  Fam Hx  Soc Hx      Reviewed and updated as needed this visit by Provider         ROS:  ENT/MOUTH: as noted above   CV: NEGATIVE for chest pain, palpitations or peripheral edema    This document serves as a record of the services and decisions personally performed and made by Joseph Rubio MD. It was created on his behalf by Ramonita  Marck, a trained medical scribe. The creation of this document is based on the provider's statements to the medical scribe.  Ramonita Marck 7:56 AM April 17, 2018    OBJECTIVE:     /84 (BP Location: Right arm, Patient Position: Chair, Cuff Size: Adult Regular)  Pulse 72  Wt 145.9 kg (321 lb 9.6 oz)  SpO2 97%  BMI 41.29 kg/m2  Body mass index is 41.29 kg/(m^2).  GENERAL: healthy, alert and no distress  HENT: right ear: occluded with cerumen, left ear canal and TM normal.     Cerumen removed right side ear canals via lavage.    ASSESSMENT/PLAN:     1. Impacted cerumen of right ear    2. Morbid obesity (H)    3. Hypertension goal BP (blood pressure) < 140/90      The information in this document, created by the medical scribe for me, accurately reflects the services I personally performed and the decisions made by me. I have reviewed and approved this document for accuracy prior to leaving the patient care area.  April 17, 2018 8:03 AM    Joseph Rubio MD  [facility]

## 2018-04-17 NOTE — MR AVS SNAPSHOT
After Visit Summary   4/17/2018    Jonathan Horn    MRN: [id number]           Patient Information     Date Of Birth          1970        Visit Information        Provider Department      4/17/2018 7:40 AM Joseph Rubio MD [facility]        Today's Diagnoses     Impacted cerumen of right ear    -  1    Morbid obesity (H)        Hypertension goal BP (blood pressure) < 140/90           Follow-ups after your visit        Who to contact     If you have questions or need follow up information about today's clinic visit or your schedule please contact [facility] directly at [phone].  Normal or non-critical lab and imaging results will be communicated to you by MyChart, letter or phone within 4 business days after the clinic has received the results. If you do not hear from us within 7 days, please contact the clinic through Mobykohart or phone. If you have a critical or abnormal lab result, we will notify you by phone as soon as possible.  Submit refill requests through CritiTech or call your pharmacy and they will forward the refill request to us. Please allow 3 business days for your refill to be completed.          Additional Information About Your Visit        MyChart Information     CritiTech gives you secure access to your electronic health record. If you see a primary care provider, you can also send messages to your care team and make appointments. If you have questions, please call your primary care clinic.  If you do not have a primary care provider, please call [phone] and they will assist you.        Care EveryWhere ID     This is your Care EveryWhere ID. This could be used by other organizations to access your [facility] medical records  [id number]        Your Vitals Were     Pulse Pulse Oximetry BMI (Body Mass Index)             72 97% 41.29 kg/m2          Blood Pressure from Last 3 Encounters:   04/17/18 137/84   08/10/17 128/72   01/25/17 120/80     Weight from Last 3 Encounters:   04/17/18 321 lb 9.6 oz (145.9 kg)   08/10/17 292 lb 4.8 oz (132.6 kg)   01/27/17 (!) 323 lb (146.5 kg)              Today, you had the following     No orders found for display       Primary Care Provider Office Phone # Fax #    Joseph Rubio [phone] [address]  [facility] [address]        Equal Access to Services     ANA MCKEON : Hadii aad ku hadasho Soomaali, waaxda luqadaha, qaybta kaalmada adeegyada, waxay idiin hayaan adeeg kharash laanette . So [facility] [phone].    ATENCIÓN: Si habla español, tiene a rodriguez disposición servicios gratuitos de asistencia lingüística. [facility] [phone].    We comply with applicable federal civil rights laws and [place] laws. We do not discriminate on the basis of race, color, national origin, age, disability, sex, sexual orientation, or gender identity.            Thank you!     Thank you for choosing [facility]  for your care. Our goal is always to provide you with excellent care. Hearing back from our patients is one way we can continue to improve our services. Please take a few minutes to complete the written survey that you may receive in the mail after your visit with us. Thank you!             Your Updated Medication List - Protect others around you: Learn how to safely use, store and throw away your medicines at www.disposemymeds.org.          This list is accurate as of 4/17/18  8:09 AM.  Always use your most recent med list.                   Brand Name Dispense Instructions for use Diagnosis    lisinopril-hydrochlorothiazide 20-12.5 MG per tablet    PRINZIDE/ZESTORETIC    90 tablet    Take 1 tablet by mouth daily    Hypertension goal BP (blood pressure) < 140/90       ZANTAC PO      Take 150 mg by mouth

## 2018-05-11 ENCOUNTER — OFFICE VISIT (OUTPATIENT)
Dept: PODIATRY | Facility: CLINIC | Age: 48
End: 2018-05-11
Payer: COMMERCIAL

## 2018-05-11 DIAGNOSIS — M76.62 ACHILLES TENDONITIS, BILATERAL: Primary | ICD-10-CM

## 2018-05-11 DIAGNOSIS — M76.61 ACHILLES TENDONITIS, BILATERAL: Primary | ICD-10-CM

## 2018-05-11 PROCEDURE — 99213 OFFICE O/P EST LOW 20 MIN: CPT | Performed by: PODIATRIST

## 2018-05-11 RX ORDER — DEXAMETHASONE SODIUM PHOSPHATE 4 MG/ML
INJECTION, SOLUTION INTRA-ARTICULAR; INTRALESIONAL; INTRAMUSCULAR; INTRAVENOUS; SOFT TISSUE
Qty: 30 ML | Refills: 0 | Status: SHIPPED | OUTPATIENT
Start: 2018-05-11 | End: 2019-07-16

## 2018-05-11 NOTE — PROGRESS NOTES
Foot & Ankle Surgery   May 11, 2018    S:  Pt is seen today for evaluation of bilateral Achilles tendonitis, left lower extremity insertion and right lower extremity midsubstance.  He was last seen January 2017.  Shoes, inserts, stretching, RICE/NSAID has been insufficient.  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  Symptoms are very limiting for him.    There were no vitals filed for this visit.'      ROS - Pos for CC.  Patient denies current nausea, vomiting, chills, fevers, belly pain, calf pain, chest pain or SOB.  Complete remainder of ROS it otherwise neg.      PE:  Gen:   No apparent distress  Eye:    Visual scanning without deficit  Ear:    Response to auditory stimuli wnl  Lung:    Non-labored breathing on RA noted  Abd:    NTND per patient report  Lymph:    Neg for pitting/non-pitting edema BLE  Vasc:    Pulses palpable, CFT minimally delayed  Neuro:    Light touch sensation intact to all sensory nerve distributions without paresthesias  Derm:    Neg for nodules, lesions or ulcerations  MSK:    Right lower extremity - thickened tendon at watershed.  Ankle dorsiflexion 0/7 knee ext/flexed; left lower extremity - tender at insertion without tendon thickening.  Ankle dorsiflexion 0/7 knee ext/flexed  Calf:    Neg for redness, swelling or tenderness      Assessment:  47 year old male with bilateral Achilles tendonitis       Plan:  Discussed etiologies, anatomy and options  1.  Bilateral Achilles tendonitis   -trilok dispensed for both ankles  -continue RICE/NSAID, stretching at home  -formal [facility] PT referral for tendonitis  -discussed negative impact his job/duties can have; note to be off work x 4 weeks  -consider advanced imaging.  He has obvious thickening of the tendon at the watershed area    Follow up:  4 weeks or sooner with acute issues       There is no height or weight on file to calculate BMI.    Weight management plan: Patient was referred to their PCP  to discuss a diet and exercise plan.         Boni Mehta DPM   Podiatric Foot & Ankle Surgeon  [facility]  [phone]

## 2018-05-11 NOTE — Clinical Note
Good morning  I saw Jonathan on the 11th for bilateral Achilles tendonitis.  He was given bilateral ankle braces and was referred to PT.  He'll follow up in 4 weeks for a recheck.  Thanks  Boni

## 2018-05-11 NOTE — MR AVS SNAPSHOT
After Visit Summary   5/11/2018    Jonathan Horn    MRN: [id number]           Patient Information     Date Of Birth          1970        Visit Information        Provider Department      5/11/2018 11:15 AM Boni Mehta DPM [facility]        Today's Diagnoses     Achilles tendonitis, bilateral    -  1       Follow-ups after your visit        Additional Services     [facility] PT, HAND, AND CHIROPRACTIC REFERRAL       === This order will print in the [facility] Scheduling  Office ===    Physical therapy, hand therapy and chiropractic care are available through:    [facility]  [facility]    Call one easy number to schedule at any of the above locations:  [phone].    Your provider has referred you to Physical Therapy at [facility] or [facility]    Indication/Reason for Referral: bilateral Achilles tendonitis; midsubstance right lower extremity, insertion left lower extremity   Onset of Illness:  1+ year  Therapy Orders:  Evaluate and Treat  Special Programs:  None  Special Request:  Equipment: As Indicated:   Exercise: Active/Assistive ROM, Conditioning, Home Exercise Program, Passive ROM, Posture/Body Mechanics, Progressive Strengthening and Stretching/Flexibility  Manual Therapy: Joint Mobilization and Myofascial Release/Massage  Modalities: As Indicated: , Iontophoresis (Please Order: Dexamethasone Sodium Phosphate - 4mg/ml injectable, 30 cc total volume) and Ultrasound    Additional Comments for the therapist or chiropractor:  8 sessions.    Please be aware that coverage of these services is subject to the terms and limitations of your health insurance plan.  Call member services at your health plan with any benefit or coverage questions.      Please bring the following to your appointment:    >>   Your personal calendar for scheduling future appointments  >>   Comfortable clothing                  Follow-up notes from your  care team     Return in about 4 weeks (around 6/8/2018).      Your next 10 appointments already scheduled     Jun 08, 2018 11:00 AM CDT   Return Visit with Boni Mehta DPM   [facility]    [address] [facility] [address] [phone]              Who to contact     If you have questions or need follow up information about today's clinic visit or your schedule please contact [facility] directly at [phone].  Normal or non-critical lab and imaging results will be communicated to you by Voltairehart, letter or phone within 4 business days after the clinic has received the results. If you do not hear from us within 7 days, please contact the clinic through KOWNt or phone. If you have a critical or abnormal lab result, we will notify you by phone as soon as possible.  Submit refill requests through [employer] or call your pharmacy and they will forward the refill request to us. Please allow 3 business days for your refill to be completed.          Additional Information About Your Visit        S[employer] Information     [employer] gives you secure access to your electronic health record. If you see a primary care provider, you can also send messages to your care team and make appointments. If you have questions, please call your primary care clinic.  If you do not have a primary care provider, please call [phone] and they will assist you.        Care EveryWhere ID     This is your Care EveryWhere ID. This could be used by other organizations to access your [facility] medical records  [id number]         Blood Pressure from Last 3 Encounters:   04/17/18 137/84   08/10/17 128/72   01/25/17 120/80    Weight from Last 3 Encounters:   04/17/18 321 lb 9.6 oz (145.9 kg)   08/10/17 292 lb 4.8 oz (132.6 kg)   01/27/17 (!) 323 lb (146.5 kg)              We Performed the Following     [facility] PT, HAND, AND CHIROPRACTIC REFERRAL          Today's Medication  Changes          These changes are accurate as of 5/11/18 11:59 PM.  If you have any questions, ask your nurse or doctor.               Start taking these medicines.        Dose/Directions    dexamethasone 4 MG/ML injection   Commonly known as:  DECADRON   Used for:  Achilles tendonitis, bilateral   Started by:  Boni Mehta DPM        To be used topically by therapist during PT sessions.   Quantity:  30 mL   Refills:  0       order for DME   Used for:  Achilles tendonitis, bilateral   Started by:  Boni Mehta DPM        Equipment being ordered: size 12 left and right Trilok ankle brace   Quantity:  2 Device   Refills:  0            Where to get your medicines      These medications were sent to [facility]/pharmacy #[id number] - [place], [place] - [address]  [address]  [address] , [facility] [address]     Phone:  [phone]     dexamethasone 4 MG/ML injection         Some of these will need a paper prescription and others can be bought over the counter.  Ask your nurse if you have questions.     Bring a paper prescription for each of these medications     order for DME                Primary Care Provider Office Phone # Fax #    Joseph Rubio [phone] [address] [address]        Equal Access to Services     ANA MCKEON AH: Hadii yuri calleo Soomaali, waaxda luqadaha, qaybta kaalmada adeegyada, harriet mccormick. So [facility] [phone].    ATENCIÓN: Si habla español, tiene a rodriguez disposición servicios gratuitos de asistencia lingüística. Llame al [phone].    We comply with applicable federal civil rights laws and [place] laws. We do not discriminate on the basis of race, color, national origin, age, disability, sex, sexual orientation, or gender identity.            Thank you!     Thank you for choosing [facility]  for your care. Our goal is always to provide you with excellent care. Hearing back from our patients is  one way we can continue to improve our services. Please take a few minutes to complete the written survey that you may receive in the mail after your visit with us. Thank you!             Your Updated Medication List - Protect others around you: Learn how to safely use, store and throw away your medicines at www.disposemymeds.org.          This list is accurate as of 5/11/18 11:59 PM.  Always use your most recent med list.                   Brand Name Dispense Instructions for use Diagnosis    dexamethasone 4 MG/ML injection    DECADRON    30 mL    To be used topically by therapist during PT sessions.    Achilles tendonitis, bilateral       lisinopril-hydrochlorothiazide 20-12.5 MG per tablet    PRINZIDE/ZESTORETIC    90 tablet    Take 1 tablet by mouth daily    Hypertension goal BP (blood pressure) < 140/90       order for DME     2 Device    Equipment being ordered: size 12 left and right Trilok ankle brace    Achilles tendonitis, bilateral       ZANTAC PO      Take 150 mg by mouth

## 2018-05-11 NOTE — LETTER
[facility]  [id number] [place] [address] [phone]          May 11, 2018    RE:  Jonathan DWYER Kory                                                                                                                                                       [facility] [facility] [address]            To whom it may concern:    Jonathan REYMUNDO Horn is under my professional care for Achilles tendonitis, bilateral.  Please excuse him from work x 4 weeks while we treat bilateral Achilles tendonitis.  His status will be updated at that time.    Thank you      YASH ChristiansenM   Podiatric Foot & Ankle Surgeon  [facility]

## 2018-05-21 ENCOUNTER — TRANSFERRED RECORDS (OUTPATIENT)
Dept: HEALTH INFORMATION MANAGEMENT | Facility: CLINIC | Age: 48
End: 2018-05-21

## 2018-08-01 ENCOUNTER — NURSE TRIAGE (OUTPATIENT)
Dept: NURSING | Facility: CLINIC | Age: 48
End: 2018-08-01

## 2018-08-01 NOTE — TELEPHONE ENCOUNTER
Pt inquired about if his tetanus shot whether it is up to date, reviewed per EPIC not due until 2021. Had a cut to bottom of foot from stepping on glass that he states went deep, declined triage.    Protocol and care advice reviewed.  Caller states understanding of the recommended disposition.   Advised to call back if further questions or concerns.      Additional Information    Health Information question, no triage required and triager able to answer question    Protocols used: INFORMATION ONLY CALL-ADULT-

## 2018-10-11 ENCOUNTER — OFFICE VISIT (OUTPATIENT)
Dept: FAMILY MEDICINE | Facility: CLINIC | Age: 48
End: 2018-10-11
Payer: COMMERCIAL

## 2018-10-11 VITALS
SYSTOLIC BLOOD PRESSURE: 142 MMHG | RESPIRATION RATE: 16 BRPM | BODY MASS INDEX: 39.17 KG/M2 | HEIGHT: 75 IN | TEMPERATURE: 97.9 F | HEART RATE: 60 BPM | DIASTOLIC BLOOD PRESSURE: 86 MMHG | WEIGHT: 315 LBS

## 2018-10-11 DIAGNOSIS — I10 HYPERTENSION GOAL BP (BLOOD PRESSURE) < 140/90: ICD-10-CM

## 2018-10-11 LAB
ANION GAP SERPL CALCULATED.3IONS-SCNC: 7 MMOL/L (ref 3–14)
BUN SERPL-MCNC: 20 MG/DL (ref 7–30)
CALCIUM SERPL-MCNC: 8.6 MG/DL (ref 8.5–10.1)
CHLORIDE SERPL-SCNC: 103 MMOL/L (ref 94–109)
CO2 SERPL-SCNC: 30 MMOL/L (ref 20–32)
CREAT SERPL-MCNC: 0.98 MG/DL (ref 0.66–1.25)
GFR SERPL CREATININE-BSD FRML MDRD: 82 ML/MIN/1.7M2
GLUCOSE SERPL-MCNC: 91 MG/DL (ref 70–99)
POTASSIUM SERPL-SCNC: 4.3 MMOL/L (ref 3.4–5.3)
SODIUM SERPL-SCNC: 140 MMOL/L (ref 133–144)

## 2018-10-11 PROCEDURE — 80048 BASIC METABOLIC PNL TOTAL CA: CPT | Performed by: PHYSICIAN ASSISTANT

## 2018-10-11 PROCEDURE — 99213 OFFICE O/P EST LOW 20 MIN: CPT | Performed by: PHYSICIAN ASSISTANT

## 2018-10-11 PROCEDURE — 36415 COLL VENOUS BLD VENIPUNCTURE: CPT | Performed by: PHYSICIAN ASSISTANT

## 2018-10-11 RX ORDER — LISINOPRIL AND HYDROCHLOROTHIAZIDE 12.5; 2 MG/1; MG/1
1 TABLET ORAL DAILY
Qty: 90 TABLET | Refills: 1 | Status: SHIPPED | OUTPATIENT
Start: 2018-10-11 | End: 2019-03-11

## 2018-10-11 NOTE — PROGRESS NOTES
"  SUBJECTIVE:   Jonathan Horn is a 48 year old male who presents to clinic today for the following health issues:      History of Present Illness     Hypertension:     Outpatient blood pressures:  Are being checked    Blood pressures checked at:  Home    Dietary sodium intake::  Not monitoring salt intake      BP Readings from Last 6 Encounters:   10/11/18 150/85   04/17/18 137/84   08/10/17 128/72   01/25/17 120/80   06/14/16 106/77   05/16/16 128/84       Hypertension Follow-up      Outpatient blood pressures are being checked at home.  Results are \"normal\".  He stopped checking eventually because they were always normal.    Low Salt Diet: no added salt    No concerns today, has not taken medication yet.  Just woke up, takes it generally a little later in the AM due to work hours.    Problem list and histories reviewed & adjusted, as indicated.  Additional history: as documented      Labs reviewed in EPIC    ROS:  Constitutional, HEENT, cardiovascular, pulmonary, gi and gu systems are negative, except as otherwise noted.    OBJECTIVE:     /85 (BP Location: Right arm, Patient Position: Sitting, Cuff Size: Adult Large)  Pulse 60  Temp 97.9  F (36.6  C) (Oral)  Resp 16  Ht 6' 2.5\" (1.892 m)  Wt 321 lb (145.6 kg)  BMI 40.66 kg/m2  Body mass index is 40.66 kg/(m^2).  GENERAL: healthy, alert and no distress  CV: regular rate and rhythm, normal S1 S2, no S3 or S4, no murmur, click or rub, no peripheral edema and peripheral pulses strong  MS: no gross musculoskeletal defects noted, no edema  SKIN: no suspicious lesions or rashes  PSYCH: mentation appears normal, affect normal/bright    Diagnostic Test Results:  none     ASSESSMENT/PLAN:   1. Hypertension goal BP (blood pressure) < 140/90  He will schedule a recheck of BP in two weeks with a nurse appointment.  BP recheck after visit came down some.  If remains elevated could consider increase in medication but hopefully this is elevated only because has not " taken med yet today.  Follow up 6 months, sooner prn.  - BASIC METABOLIC PANEL  - lisinopril-hydrochlorothiazide (PRINZIDE/ZESTORETIC) 20-12.5 MG per tablet; Take 1 tablet by mouth daily  Dispense: 90 tablet; Refill: 1        oCdy Blackburn PA-C  [facility]

## 2018-10-11 NOTE — LETTER
[facility]  October 17, 2018      [place] [address]           Phone :  [phone]          Fax:  [phone]  Jonathan Horn  [address] [place] [address]        Dear Jonathan -      Here are your results from your recent clinic visit.    Kidney, glucose and electrolyte tests are normal.  Don't forget to come back in for a nurse appointment and have them recheck your blood pressure!      If you have questions please call the clinic at [phone].      Take care,    Cody Blackburn PA-C/vonnie  Results for orders placed or performed in visit on 10/11/18   BASIC METABOLIC PANEL   Result Value Ref Range    Sodium 140 133 - 144 mmol/L    Potassium 4.3 3.4 - 5.3 mmol/L    Chloride 103 94 - 109 mmol/L    Carbon Dioxide 30 20 - 32 mmol/L    Anion Gap 7 3 - 14 mmol/L    Glucose 91 70 - 99 mg/dL    Urea Nitrogen 20 7 - 30 mg/dL    Creatinine 0.98 0.66 - 1.25 mg/dL    GFR Estimate 82 >60 mL/min/1.7m2    GFR Estimate If Black >90 >60 mL/min/1.7m2    Calcium 8.6 8.5 - 10.1 mg/dL

## 2018-10-12 DIAGNOSIS — I10 HYPERTENSION GOAL BP (BLOOD PRESSURE) < 140/90: ICD-10-CM

## 2018-10-12 RX ORDER — LISINOPRIL AND HYDROCHLOROTHIAZIDE 12.5; 2 MG/1; MG/1
TABLET ORAL
Qty: 90 TABLET | Refills: 4 | OUTPATIENT
Start: 2018-10-12

## 2018-10-12 NOTE — TELEPHONE ENCOUNTER
"Last Written Prescription Date:  10/11/18  Last Fill Quantity: 90,  # refills: 1   Last office visit: 10/11/2018 with prescribing provider:  Cody   Future Office Visit:    Requested Prescriptions   Pending Prescriptions Disp Refills     lisinopril-hydrochlorothiazide (PRINZIDE/ZESTORETIC) 20-12.5 MG per tablet [Pharmacy Med Name: LISINOPRIL-HCTZ 20-12.5 MG TAB] 90 tablet 4     Sig: TAKE 1 TABLET BY MOUTH DAILY    Diuretics (Including Combos) Protocol Failed    10/12/2018  8:28 AM       Failed - Blood pressure under 140/90 in past 12 months    BP Readings from Last 3 Encounters:   10/11/18 142/86   04/17/18 137/84   08/10/17 128/72                Passed - Recent (12 mo) or future (30 days) visit within the authorizing provider's specialty    Patient had office visit in the last 12 months or has a visit in the next 30 days with authorizing provider or within the authorizing provider's specialty.  See \"Patient Info\" tab in inbasket, or \"Choose Columns\" in Meds & Orders section of the refill encounter.           Passed - Patient is age 18 or older       Passed - Normal serum creatinine on file in past 12 months    Recent Labs   Lab Test  10/11/18   0904   CR  0.98             Passed - Normal serum potassium on file in past 12 months    Recent Labs   Lab Test  10/11/18   0904   POTASSIUM  4.3                   Passed - Normal serum sodium on file in past 12 months    Recent Labs   Lab Test  10/11/18   0904   NA  140                "

## 2019-01-03 ENCOUNTER — TELEPHONE (OUTPATIENT)
Dept: FAMILY MEDICINE | Facility: CLINIC | Age: 49
End: 2019-01-03

## 2019-01-03 DIAGNOSIS — I10 HYPERTENSION GOAL BP (BLOOD PRESSURE) < 140/90: ICD-10-CM

## 2019-01-03 RX ORDER — LISINOPRIL AND HYDROCHLOROTHIAZIDE 12.5; 2 MG/1; MG/1
1 TABLET ORAL DAILY
Qty: 90 TABLET | Refills: 0 | Status: SHIPPED | OUTPATIENT
Start: 2019-01-03 | End: 2019-03-11

## 2019-01-03 NOTE — TELEPHONE ENCOUNTER
Has refill available.  Called patient.  V/M not set-up.  PENELOPE Koch messaged that she is taking care of.  Shayy Noble RN

## 2019-01-03 NOTE — TELEPHONE ENCOUNTER
Patient called states that he forgot his Rx in [place], and now is requesting a refill as he is out because of this issue.    Will PCP fill another?      Colette Donohue/SAAD

## 2019-02-01 ENCOUNTER — OFFICE VISIT (OUTPATIENT)
Dept: FAMILY MEDICINE | Facility: CLINIC | Age: 49
End: 2019-02-01
Payer: COMMERCIAL

## 2019-02-01 VITALS
HEART RATE: 74 BPM | TEMPERATURE: 98.7 F | DIASTOLIC BLOOD PRESSURE: 90 MMHG | BODY MASS INDEX: 39.17 KG/M2 | WEIGHT: 315 LBS | HEIGHT: 75 IN | OXYGEN SATURATION: 96 % | SYSTOLIC BLOOD PRESSURE: 160 MMHG

## 2019-02-01 DIAGNOSIS — E66.01 MORBID OBESITY (H): ICD-10-CM

## 2019-02-01 DIAGNOSIS — M79.671 RIGHT FOOT PAIN: Primary | ICD-10-CM

## 2019-02-01 DIAGNOSIS — I10 HYPERTENSION GOAL BP (BLOOD PRESSURE) < 140/90: ICD-10-CM

## 2019-02-01 PROCEDURE — 99214 OFFICE O/P EST MOD 30 MIN: CPT | Performed by: NURSE PRACTITIONER

## 2019-02-01 RX ORDER — LISINOPRIL AND HYDROCHLOROTHIAZIDE 20; 25 MG/1; MG/1
1 TABLET ORAL DAILY
Qty: 30 TABLET | Refills: 1 | Status: SHIPPED | OUTPATIENT
Start: 2019-02-01 | End: 2019-03-11

## 2019-02-01 ASSESSMENT — MIFFLIN-ST. JEOR: SCORE: 2398.74

## 2019-02-01 NOTE — PATIENT INSTRUCTIONS
Rest, ice, compression wrap and ibuprofen.  Can take 600 mg of ibuprofen every 6 hours.  If pain is not improving over the next 2-4 weeks return for follow up.  BP recheck in 1-2 weeks.  See PCP for BP in 1 month.

## 2019-02-01 NOTE — PROGRESS NOTES
SUBJECTIVE:   Jonathan Horn is a 48 year old male who presents to clinic today for the following health issues:      Joint Pain-  Right foot pain    Onset: last night    Description:   Location: right ankle and top of foot  Character: Stabbing and grinding    Intensity: 2-3/10    Progression of Symptoms: same    Accompanying Signs & Symptoms:  Other symptoms: numbness and swelling    History:   Previous similar pain: no       Precipitating factors:   Trauma or overuse: YES- pushing the car with the kids    Alleviating factors:  Improved by: nothing    Therapies Tried and outcome: none  Was trying to push a car up a slippery hill and slipped.      BP Readings from Last 6 Encounters:   02/01/19 160/90   10/11/18 142/86   04/17/18 137/84   08/10/17 128/72   01/25/17 120/80   06/14/16 106/77       Problem list and histories reviewed & adjusted, as indicated.  Additional history: as documented    Current Outpatient Medications   Medication Sig Dispense Refill     lisinopril-hydrochlorothiazide (PRINZIDE/ZESTORETIC) 20-12.5 MG per tablet Take 1 tablet by mouth daily 90 tablet 1     lisinopril-hydrochlorothiazide (PRINZIDE/ZESTORETIC) 20-25 MG tablet Take 1 tablet by mouth daily 30 tablet 1     RaNITidine HCl (ZANTAC PO) Take 150 mg by mouth       dexamethasone (DECADRON) 4 MG/ML injection To be used topically by therapist during PT sessions. (Patient not taking: Reported on 10/11/2018) 30 mL 0     lisinopril-hydrochlorothiazide (PRINZIDE/ZESTORETIC) 20-12.5 MG tablet TAKE 1 TABLET BY MOUTH DAILY (Patient not taking: Reported on 2/1/2019) 90 tablet 0     order for DME Equipment being ordered: size 12 left and right Trilok ankle brace 2 Device 0     No Known Allergies    Reviewed and updated as needed this visit by clinical staff  Tobacco  Allergies  Meds  Med Hx  Surg Hx  Fam Hx  Soc Hx      Reviewed and updated as needed this visit by Provider         ROS:  Constitutional, HEENT, cardiovascular, pulmonary, gi  "and gu systems are negative, except as otherwise noted.    OBJECTIVE:     /90   Pulse 74   Temp 98.7  F (37.1  C) (Oral)   Ht 1.892 m (6' 2.5\")   Wt 145.6 kg (321 lb)   SpO2 96%   BMI 40.66 kg/m    Body mass index is 40.66 kg/m .  GENERAL: healthy, alert and no distress  MS: no gross musculoskeletal defects noted, R foot: superolateral tenderness and slight swelling, no bruising or redness, FROM, normal strength    SKIN: no suspicious lesions or rashes    Diagnostic Test Results:  none     ASSESSMENT/PLAN:   1. Right foot pain  Rest, ice, compression wrap, and NSAIDs.  If no improvement in 2-4 weeks return for follow up.     2. Hypertension goal BP (blood pressure) < 140/90  High today and up at last visit.  Will increase hydrochlorothiazide to 25 mg.  Recheck BP in 1-2 weeks and follow up with PCP in 1 month.   - lisinopril-hydrochlorothiazide (PRINZIDE/ZESTORETIC) 20-25 MG tablet; Take 1 tablet by mouth daily  Dispense: 30 tablet; Refill: 1    3. Morbid obesity (H)  Discussed diet and exercise.  Walks quite a bit for work; always more than 10,000 steps, but minimal dedicated exercise.  Admits to drinking a fair amount of soda a day.  Really likes carbonated drinks.  Encouraged cutting out all sugary drinks.  Try sparkling water for carbonated drink.        F/u 1 mos    JONE Burnham [facility]    "

## 2019-02-11 ENCOUNTER — TELEPHONE (OUTPATIENT)
Dept: FAMILY MEDICINE | Facility: CLINIC | Age: 49
End: 2019-02-11

## 2019-02-11 NOTE — TELEPHONE ENCOUNTER
Panel Management Review      Patient has the following on his problem list:     Hypertension   Last three blood pressure readings:  BP Readings from Last 3 Encounters:   02/01/19 160/90   10/11/18 142/86   04/17/18 137/84     Blood pressure: FAILED    HTN Guidelines:  Age 18-59 BP range:  Less than 140/90  Age 60-85 with Diabetes:  Less than 140/90  Age 60-85 without Diabetes:  less than 150/90      Composite cancer screening  Chart review shows that this patient is due/due soon for the following None  Summary:    Patient is due/failing the following:   BP CHECK    Action needed:   Patient needs nurse only appointment.    Type of outreach:    Phone, left message for patient to call back.     Questions for provider review:    None                                                                                                                                    Edna Weir CMA       Chart routed to none .

## 2019-03-11 ENCOUNTER — TELEPHONE (OUTPATIENT)
Dept: FAMILY MEDICINE | Facility: CLINIC | Age: 49
End: 2019-03-11

## 2019-03-11 ENCOUNTER — ALLIED HEALTH/NURSE VISIT (OUTPATIENT)
Dept: NURSING | Facility: CLINIC | Age: 49
End: 2019-03-11
Payer: COMMERCIAL

## 2019-03-11 VITALS — SYSTOLIC BLOOD PRESSURE: 154 MMHG | HEART RATE: 64 BPM | DIASTOLIC BLOOD PRESSURE: 86 MMHG

## 2019-03-11 DIAGNOSIS — I10 HYPERTENSION GOAL BP (BLOOD PRESSURE) < 140/90: ICD-10-CM

## 2019-03-11 DIAGNOSIS — E66.01 MORBID OBESITY (H): Primary | ICD-10-CM

## 2019-03-11 PROCEDURE — 99207 ZZC NO CHARGE NURSE ONLY: CPT

## 2019-03-11 RX ORDER — LISINOPRIL AND HYDROCHLOROTHIAZIDE 20; 25 MG/1; MG/1
1 TABLET ORAL DAILY
Qty: 30 TABLET | Refills: 0 | Status: SHIPPED | OUTPATIENT
Start: 2019-03-11 | End: 2019-07-01

## 2019-03-11 NOTE — TELEPHONE ENCOUNTER
Pt has not done a sleep study.  Please complete pended referral and they will call him to schedule.  He was advised to schedule a follow up in a month as well    Angeli Randall RN, BSN

## 2019-03-11 NOTE — TELEPHONE ENCOUNTER
Patient here today for a Nurse Only Blood Pressure check.    BP Readings from Last 3 Encounters:   03/11/19 154/86   02/01/19 160/90   10/11/18 142/86     Patient currently taking:    lisinopril-hydrochlorothiazide (PRINZIDE/ZESTORETIC) 20-25 MG tablet  One tablet daily    Patient feeling well.  No problems noted.  Please advise further.    Lena Colindres RN

## 2019-03-11 NOTE — TELEPHONE ENCOUNTER
Uncontrolled.  Would likely recommend adding a 3rd BP agent.  Would consider add amlodipine 5 mg daily.    Needs sleep study if he has not done this recently.    Recheck in 1 month.

## 2019-03-11 NOTE — LETTER
March 13, 2019      Jonathan Horn  [address] [facility] [address]        Dear Jonathan,     Dmitri have been referred to the [facility] and can call them at [phone] to schedule.  If you would like we can start another blood pressure medication called amlodipine 5 mg now or you can continue to monitor your blood pressure and follow up after complete the sleep study.  Please let us know what you would like to do.      Sincerely,    Joseph Rubio MD/Angeli Randall RN, BSN

## 2019-03-11 NOTE — PROGRESS NOTES
Jonathan Horn is a 48 year old patient who comes in today for a Blood Pressure check.  Initial BP:  /86 (BP Location: Right arm, Patient Position: Chair, Cuff Size: Adult Large)   Pulse 64      Data Unavailable  Disposition: BP elevated.  Triage RN notified, patient asked to wait.  Lana Taylor CMA ([facility])

## 2019-03-11 NOTE — PROGRESS NOTES
Jonathan Horn is a 48 year old year old patient who comes in today for a Blood Pressure check because of ongoing blood pressure monitoring.  Vital Signs not repeated by RN   Patient is taking medication as prescribed  Patient is tolerating medications well.  Patient is not monitoring Blood Pressure at home.    Current complaints: none  Disposition:  Telephone message created and sent to provider.    Lena Colindres RN

## 2019-03-12 NOTE — TELEPHONE ENCOUNTER
Sleep study referral placed.  We can start the amlodipine 5 mg now or he can continue to monitor blood pressure and recheck with me in clinic after sleep study per his preference.

## 2019-04-03 DIAGNOSIS — I10 HYPERTENSION GOAL BP (BLOOD PRESSURE) < 140/90: ICD-10-CM

## 2019-04-03 NOTE — TELEPHONE ENCOUNTER
"Requested Prescriptions   Pending Prescriptions Disp Refills     lisinopril-hydrochlorothiazide (PRINZIDE/ZESTORETIC) 20-25 MG tablet [Pharmacy Med Name: LISINOPRIL-HCTZ 20-25 MG TAB] 30 tablet 1     Sig: TAKE 1 TABLET BY MOUTH EVERY DAY    Diuretics (Including Combos) Protocol Failed - 4/3/2019 11:32 AM       Failed - Blood pressure under 140/90 in past 12 months    BP Readings from Last 3 Encounters:   03/11/19 154/86   02/01/19 160/90   10/11/18 142/86                Passed - Recent (12 mo) or future (30 days) visit within the authorizing provider's specialty    Patient had office visit in the last 12 months or has a visit in the next 30 days with authorizing provider or within the authorizing provider's specialty.  See \"Patient Info\" tab in inbasket, or \"Choose Columns\" in Meds & Orders section of the refill encounter.             Passed - Medication is active on med list       Passed - Patient is age 18 or older       Passed - Normal serum creatinine on file in past 12 months    Recent Labs   Lab Test 10/11/18  0904   CR 0.98             Passed - Normal serum potassium on file in past 12 months    Recent Labs   Lab Test 10/11/18  0904   POTASSIUM 4.3                   Passed - Normal serum sodium on file in past 12 months    Recent Labs   Lab Test 10/11/18  0904                   "

## 2019-04-03 NOTE — LETTER
[address], [address]  [facility] [address]  Phone: [phone]  Fax: [phone]    04/08/19    Jonathan Horn  [facility] [facility] [address]      Dear Enrike:     We recently received a call from your pharmacy requesting a refill of your medication - - lisinopril-hydrochlorothiazide (PRINZIDE/ZESTORETIC) 20-25 MG tablet.    A review of your chart indicates that an appointment is required with your provider for BP recheck. Please call the clinic at [phone] to schedule your appointment.    We have authorized a short term refill of your medication to allow time for you to schedule your appointment.    Taking care of your health is important to us, and ongoing visits with your provider are vital to your care.  We look forward to seeing you in the near future.          Sincerely,      JONE Burnham CNP/Mariel WOLFF RN

## 2019-04-03 NOTE — TELEPHONE ENCOUNTER
Per LOV with KS:    2. Hypertension goal BP (blood pressure) < 140/90  High today and up at last visit.  Will increase hydrochlorothiazide to 25 mg.  Recheck BP in 1-2 weeks and follow up with PCP in 1 month.   - lisinopril-hydrochlorothiazide (PRINZIDE/ZESTORETIC) 20-25 MG tablet; Take 1 tablet by mouth daily  Dispense: 30 tablet; Refill: 1       Called the Pt to schedule him to see Dr. Rubio (his PCP). No answer, unable to leave VM as the voicemail is not set up yet. Will need to recall.     Edna Jimenez RN -- [facility] Workforce

## 2019-04-08 RX ORDER — LISINOPRIL AND HYDROCHLOROTHIAZIDE 20; 25 MG/1; MG/1
1 TABLET ORAL DAILY
Qty: 14 TABLET | Refills: 0 | Status: SHIPPED | OUTPATIENT
Start: 2019-04-08 | End: 2019-05-06

## 2019-04-08 NOTE — TELEPHONE ENCOUNTER
Last Written Prescription Date:  3.11.19  Last Fill Quantity: 30,  # refills: 0   Last office visit: 2/1/2019 with prescribing provider:  Strong   Future Office Visit:      BP Readings from Last 6 Encounters:   03/11/19 154/86   02/01/19 160/90   10/11/18 142/86   04/17/18 137/84   08/10/17 128/72   01/25/17 120/80       Medication is being filled for 1 time refill only due to:  unable to reach pt by phone, due for OV/BP recheck, letter sent to make appt     Mariel Parker RN, BS  Clinical Nurse Triage.

## 2019-04-15 ENCOUNTER — ALLIED HEALTH/NURSE VISIT (OUTPATIENT)
Dept: NURSING | Facility: CLINIC | Age: 49
End: 2019-04-15
Payer: COMMERCIAL

## 2019-04-15 VITALS — HEART RATE: 57 BPM | SYSTOLIC BLOOD PRESSURE: 139 MMHG | DIASTOLIC BLOOD PRESSURE: 90 MMHG

## 2019-04-15 DIAGNOSIS — I10 HYPERTENSION GOAL BP (BLOOD PRESSURE) < 140/90: Primary | ICD-10-CM

## 2019-04-15 PROCEDURE — 99207 ZZC NO CHARGE NURSE ONLY: CPT

## 2019-04-15 NOTE — NURSING NOTE
Jonathan Horn is a 48 year old year old patient who comes in today for a Blood Pressure check because of ongoing blood pressure monitoring.  Vital Signs as repeated by Mariel Parker RN, BS  Clinical Nurse Triage.  Patient is taking medication as prescribed  Patient is tolerating medications well.  Patient is not monitoring Blood Pressure at home.    Current complaints:  Epistaxis, reports he has had them since childhood  Disposition:  Note routed to PCP  Pt is open to starting new medication, previous encounter mentioned amlodipine  BP Readings from Last 6 Encounters:   04/15/19 139/90   03/11/19 154/86   02/01/19 160/90   10/11/18 142/86   04/17/18 137/84   08/10/17 128/72         Mariel Parker RN, BS  Clinical Nurse Triage.

## 2019-04-15 NOTE — PROGRESS NOTES
Jonathan REYMUNDO Horn is a 48 year old patient who comes in today for a Blood Pressure check.  Initial BP:  /72   Pulse 60      60  Disposition: follow-up as previously indicated by provider

## 2019-04-15 NOTE — TELEPHONE ENCOUNTER
Pt in clinic today for BP recheck  See encounter from 4.15.19  Pt open to starting amlodipine if needed  Has sleep study scheduled     BP Readings from Last 6 Encounters:   04/15/19 139/90   03/11/19 154/86   02/01/19 160/90   10/11/18 142/86   04/17/18 137/84   08/10/17 128/72         Route to provider to review and advise    Mariel Parker RN Nurse Triage

## 2019-04-25 RX ORDER — AMLODIPINE BESYLATE 5 MG/1
5 TABLET ORAL DAILY
Qty: 30 TABLET | Refills: 1 | Status: SHIPPED | OUTPATIENT
Start: 2019-04-25 | End: 2019-05-18

## 2019-05-06 ENCOUNTER — MYC REFILL (OUTPATIENT)
Dept: FAMILY MEDICINE | Facility: CLINIC | Age: 49
End: 2019-05-06

## 2019-05-06 DIAGNOSIS — I10 HYPERTENSION GOAL BP (BLOOD PRESSURE) < 140/90: ICD-10-CM

## 2019-05-07 RX ORDER — LISINOPRIL AND HYDROCHLOROTHIAZIDE 20; 25 MG/1; MG/1
1 TABLET ORAL DAILY
Qty: 90 TABLET | Refills: 0 | Status: SHIPPED | OUTPATIENT
Start: 2019-05-07 | End: 2019-07-01

## 2019-05-07 NOTE — TELEPHONE ENCOUNTER
Routing refill request to provider for review/approval because:  Labs out of range:  BP  Angeli Randall RN, BSN

## 2019-05-07 NOTE — TELEPHONE ENCOUNTER
"Requested Prescriptions   Pending Prescriptions Disp Refills     lisinopril-hydrochlorothiazide (PRINZIDE/ZESTORETIC) 20-25 MG tablet 14 tablet 0     Sig: Take 1 tablet by mouth daily . MUST SEE PROVIDER FOR REFILL     Last Written Prescription Date:  04/08/2019  Last Fill Quantity: 14 tablet,  # refills: 0   Last office visit: 2/1/2019 with prescribing provider:  04/17/2018   Future Office Visit:          Diuretics (Including Combos) Protocol Failed - 5/7/2019  8:17 AM        Failed - Blood pressure under 140/90 in past 12 months     BP Readings from Last 3 Encounters:   04/15/19 139/90   03/11/19 154/86   02/01/19 160/90                 Passed - Recent (12 mo) or future (30 days) visit within the authorizing provider's specialty     Patient had office visit in the last 12 months or has a visit in the next 30 days with authorizing provider or within the authorizing provider's specialty.  See \"Patient Info\" tab in inbasket, or \"Choose Columns\" in Meds & Orders section of the refill encounter.              Passed - Medication is active on med list        Passed - Patient is age 18 or older        Passed - Normal serum creatinine on file in past 12 months     Recent Labs   Lab Test 10/11/18  0904   CR 0.98              Passed - Normal serum potassium on file in past 12 months     Recent Labs   Lab Test 10/11/18  0904   POTASSIUM 4.3                    Passed - Normal serum sodium on file in past 12 months     Recent Labs   Lab Test 10/11/18  0904                 Navdeep MEDEIROS  "

## 2019-05-18 DIAGNOSIS — I10 HYPERTENSION GOAL BP (BLOOD PRESSURE) < 140/90: ICD-10-CM

## 2019-05-20 RX ORDER — AMLODIPINE BESYLATE 5 MG/1
TABLET ORAL
Qty: 30 TABLET | Refills: 0 | Status: SHIPPED | OUTPATIENT
Start: 2019-05-20 | End: 2019-06-21

## 2019-05-20 NOTE — TELEPHONE ENCOUNTER
"Routing refill request to provider for review/approval because:  Labs out of range:  BP  Angeli Randall RN, BSN      Last Written Prescription Date:  4/25/19  Last Fill Quantity: 30,  # refills: 1   Last office visit: 2/1/2019 with prescribing provider:  Judith Patrick   Future Office Visit:    Requested Prescriptions   Pending Prescriptions Disp Refills     amLODIPine (NORVASC) 5 MG tablet [Pharmacy Med Name: AMLODIPINE BESYLATE 5 MG TAB] 30 tablet 0     Sig: TAKE 1 TABLET BY MOUTH EVERY DAY       Calcium Channel Blockers Protocol  Failed - 5/18/2019  8:36 AM        Failed - Blood pressure under 140/90 in past 12 months     BP Readings from Last 3 Encounters:   04/15/19 139/90   03/11/19 154/86   02/01/19 160/90                 Failed - Recent (12 mo) or future (30 days) visit within the authorizing provider's specialty     Patient had office visit in the last 12 months or has a visit in the next 30 days with authorizing provider or within the authorizing provider's specialty.  See \"Patient Info\" tab in inbasket, or \"Choose Columns\" in Meds & Orders section of the refill encounter.              Passed - Medication is active on med list        Passed - Patient is age 18 or older        Passed - Normal serum creatinine on file in past 12 months     Recent Labs   Lab Test 10/11/18  0904   CR 0.98               "

## 2019-06-18 ENCOUNTER — MYC MEDICAL ADVICE (OUTPATIENT)
Dept: FAMILY MEDICINE | Facility: CLINIC | Age: 49
End: 2019-06-18

## 2019-06-18 NOTE — TELEPHONE ENCOUNTER
Panel Management Review      Patient has the following on his problem list:     Hypertension   Last three blood pressure readings:  BP Readings from Last 3 Encounters:   04/15/19 139/90   03/11/19 154/86   02/01/19 160/90     Blood pressure: FAILED    HTN Guidelines:  Less than 140/90      Composite cancer screening  Chart review shows that this patient is due/due soon for the following None  Summary:    Patient is due/failing the following:   BP CHECK    Action needed:   Patient needs nurse only appointment.    Type of outreach:    Sent Spreadshirt message.    Questions for provider review:    None                                                                                                                                    Kylie Garcia MA         Chart routed to Care Team .

## 2019-06-21 DIAGNOSIS — I10 HYPERTENSION GOAL BP (BLOOD PRESSURE) < 140/90: ICD-10-CM

## 2019-06-21 NOTE — TELEPHONE ENCOUNTER
Routing refill request to provider for review/approval because:  BP out of range.     Please advise if you want an OV or Nurse Only BP check for this.     Edna Jimenez, RN -- [facility] Workforce

## 2019-06-24 RX ORDER — AMLODIPINE BESYLATE 5 MG/1
TABLET ORAL
Qty: 30 TABLET | Refills: 0 | Status: SHIPPED | OUTPATIENT
Start: 2019-06-24 | End: 2019-07-01

## 2019-06-25 NOTE — TELEPHONE ENCOUNTER
2nd attempt,   Tried to call patient, his voice mail has not been set up, letter sent.  Kylie Garcia MA

## 2019-07-01 ENCOUNTER — OFFICE VISIT (OUTPATIENT)
Dept: FAMILY MEDICINE | Facility: CLINIC | Age: 49
End: 2019-07-01
Payer: COMMERCIAL

## 2019-07-01 VITALS
BODY MASS INDEX: 39.17 KG/M2 | HEART RATE: 65 BPM | OXYGEN SATURATION: 98 % | WEIGHT: 315 LBS | DIASTOLIC BLOOD PRESSURE: 82 MMHG | RESPIRATION RATE: 16 BRPM | TEMPERATURE: 98.3 F | HEIGHT: 75 IN | SYSTOLIC BLOOD PRESSURE: 140 MMHG

## 2019-07-01 DIAGNOSIS — I10 HYPERTENSION GOAL BP (BLOOD PRESSURE) < 140/90: Primary | ICD-10-CM

## 2019-07-01 PROCEDURE — 99213 OFFICE O/P EST LOW 20 MIN: CPT | Performed by: NURSE PRACTITIONER

## 2019-07-01 RX ORDER — LISINOPRIL AND HYDROCHLOROTHIAZIDE 20; 25 MG/1; MG/1
1 TABLET ORAL DAILY
Qty: 90 TABLET | Refills: 0 | Status: SHIPPED | OUTPATIENT
Start: 2019-07-01 | End: 2019-07-16

## 2019-07-01 RX ORDER — LISINOPRIL AND HYDROCHLOROTHIAZIDE 20; 25 MG/1; MG/1
1 TABLET ORAL DAILY
Qty: 30 TABLET | Refills: 0 | Status: CANCELLED | OUTPATIENT
Start: 2019-07-01

## 2019-07-01 RX ORDER — AMLODIPINE BESYLATE 5 MG/1
5 TABLET ORAL DAILY
Qty: 90 TABLET | Refills: 0 | Status: SHIPPED | OUTPATIENT
Start: 2019-07-01 | End: 2019-07-16

## 2019-07-01 ASSESSMENT — MIFFLIN-ST. JEOR: SCORE: 2384.66

## 2019-07-01 NOTE — PROGRESS NOTES
"SUBJECTIVE:   Jonathan Horn is a 49 year old male who presents for Preventive Visit.  {PVP to remind patient that this is not necessarily a physical exam; physical exam may or may not be done:418885::\"click delete button to remove this line now\"}  {PVP to inform patient that additional E&M charge may apply, if additional problems addressed:512377::\"click delete button to remove this line now\"}  Are you in the first 12 months of your Medicare coverage?  { :988628::\"No\"}    HPI  Do you feel safe in your environment? { :879718}    Do you have a Health Care Directive? { :990248}    {Hearing Test Done (Optional):792011}  Fall risk  { :262966}  {If any of the above assessments are answered yes, consider ordering appropriate referrals (Optional):565445::\"click delete button to remove this line now\"}  Cognitive Screening { :236200}    {Do you have sleep apnea, excessive snoring or daytime drowsiness? (Optional):752206}    Reviewed and updated as needed this visit by clinical staff         Reviewed and updated as needed this visit by Provider        Social History     Tobacco Use     Smoking status: Never Smoker     Smokeless tobacco: Former User     Types: Chew     Tobacco comment: quit chew 2 months ago   Substance Use Topics     Alcohol use: Yes     Comment: social     {Rooming Staff- Complete this question if Prescreen response is not shown below for today's visit. If you drink alcohol do you typically have >3 drinks per day or >7 drinks per week? (Optional):352216}    Alcohol Use 5/16/2016   Prescreen: >3 drinks/day or >7 drinks/week? The patient does not drink >3 drinks per day nor >7 drinks per week.   {add AUDIT responses (Optional) (A score of 7 for adult men is an indication of hazardous drinking; a score of 8 or more is an indication of an alcohol use disorder.  A score of 7 or more for adult women is an indication of hazardous drinking or an alchohol use disorder):229518}    {Outside tests to abstract? " ":762909}    {additional problems to add (Optional):181295}    Current providers sharing in care for this patient include: {Rooming staff:  Please update Care Team in Rooming Activity, refresh this note and then delete this statement}  Patient Care Team:  Joseph Rubio MD as PCP - General (Family Practice)  Judith Patrick APRN CNP as Assigned PCP    The following health maintenance items are reviewed in Epic and correct as of today:  Health Maintenance   Topic Date Due     HIV SCREENING  1984     PREVENTIVE CARE VISIT  2017     PHQ-2  2019     ZOSTER IMMUNIZATION (1 of 2) 2019     INFLUENZA VACCINE (1) 2019     BMP  10/11/2019     LIPID  2021     COLONOSCOPY  2021     DTAP/TDAP/TD IMMUNIZATION (2 - Td) 10/11/2021     IPV IMMUNIZATION  Aged Out     MENINGITIS IMMUNIZATION  Aged Out     {Chronicprobdata (optional):355160}  {Decision Support (Optional):479296}    Review of Systems  {ROS COMP (Optional):242543}    OBJECTIVE:   There were no vitals taken for this visit. Estimated body mass index is 40.66 kg/m  as calculated from the following:    Height as of 19: 1.892 m (6' 2.5\").    Weight as of 19: 145.6 kg (321 lb).  Physical Exam  {Exam (Optional) :400062}    {Diagnostic Test Results (Optional):883470::\"Diagnostic Test Results:\",\"Labs reviewed in Epic\"}    ASSESSMENT / PLAN:   {Diag Picklist:665763}    End of Life Planning:  Patient currently has an advanced directive: { :890292}    COUNSELING:  {Medicare Counselin}    Estimated body mass index is 40.66 kg/m  as calculated from the following:    Height as of 19: 1.892 m (6' 2.5\").    Weight as of 19: 145.6 kg (321 lb).    {Weight Management Plan (ACO) Complete if BMI is abnormal-  Ages 18-64  BMI >24.9.  Age 65+ with BMI <23 or >30 (Optional):044925}     reports that he has never smoked. He has quit using smokeless tobacco. His smokeless tobacco use included chew.  {Tobacco Cessation -- " Complete if patient is a smoker (Optional):605908}    Appropriate preventive services were discussed with this patient, including applicable screening as appropriate for cardiovascular disease, diabetes, osteopenia/osteoporosis, and glaucoma.  As appropriate for age/gender, discussed screening for colorectal cancer, prostate cancer, breast cancer, and cervical cancer. Checklist reviewing preventive services available has been given to the patient.    Reviewed patients plan of care and provided an AVS. The {CarePlan:366414} for Jonathan meets the Care Plan requirement. This Care Plan has been established and reviewed with the {PATIENT, FAMILY MEMBER, CAREGIVER:508116}.    Counseling Resources:  ATP IV Guidelines  Pooled Cohorts Equation Calculator  Breast Cancer Risk Calculator  FRAX Risk Assessment  ICSI Preventive Guidelines  Dietary Guidelines for Americans, 2010  USDA's MyPlate  ASA Prophylaxis  Lung CA Screening    JONE Burnham [facility]    Identified Health Risks:

## 2019-07-01 NOTE — PROGRESS NOTES
Subjective     Jonathan Horn is a 49 year old male who presents to clinic today for the following health issues:    HPI   Hypertension Follow-up      Do you check your blood pressure regularly outside of the clinic? No     Are you following a low salt diet? No    Are your blood pressures ever more than 140 on the top number (systolic) OR more   than 90 on the bottom number (diastolic), for example 140/90? No- N/A    Amount of exercise or physical activity: None    Problems taking medications regularly: No    Medication side effects: none    Diet: regular (no restrictions)      In February hydrochlorothiazide was increased to 25 mg.  On return for BP recheck BP was still running high and in April was started on norvasc.  Today he reports his mother passed away 1 month ago and he ran out of medication, so he has been taking the leftover lisinopril 20mg-hydrochlorothiazide 12.5 mg in addition to 5 mg norvasc.  He gets little to no dedicated physical activity, but reports work is physically demanding and can walk several miles a day at work.  He drinks 3 cans of soda a day--some diet and some regular.  He tried drinking iced tea/lemondae light for awhile in place of soda and didn't see any change in weight, so went back to soda.  He hasn't noticed any swelling in feet/ankles since starting norvasc.  Denies CP, SOB, palpitations.     Current Outpatient Medications   Medication Sig Dispense Refill     amLODIPine (NORVASC) 5 MG tablet Take 1 tablet (5 mg) by mouth daily 90 tablet 0     lisinopril-hydrochlorothiazide (PRINZIDE/ZESTORETIC) 20-25 MG tablet Take 1 tablet by mouth daily 90 tablet 0     RaNITidine HCl (ZANTAC PO) Take 150 mg by mouth       dexamethasone (DECADRON) 4 MG/ML injection To be used topically by therapist during PT sessions. (Patient not taking: Reported on 10/11/2018) 30 mL 0     order for DME Equipment being ordered: size 12 left and right Trilok ankle brace 2 Device 0     No Known Allergies  BP  "Readings from Last 3 Encounters:   07/01/19 140/82   04/15/19 139/90   03/11/19 154/86    Wt Readings from Last 3 Encounters:   07/01/19 144.7 kg (319 lb)   02/01/19 145.6 kg (321 lb)   10/11/18 145.6 kg (321 lb)                  BP Readings from Last 6 Encounters:   07/01/19 140/82   04/15/19 139/90   03/11/19 154/86   02/01/19 160/90   10/11/18 142/86   04/17/18 137/84         Reviewed and updated as needed this visit by Provider  Meds         Review of Systems   ROS COMP: Constitutional, HEENT, cardiovascular, pulmonary, gi and gu systems are negative, except as otherwise noted.      Objective    /82 (BP Location: Right arm, Patient Position: Sitting, Cuff Size: Adult Large)   Pulse 65   Temp 98.3  F (36.8  C) (Skin)   Resp 16   Ht 1.892 m (6' 2.5\")   Wt 144.7 kg (319 lb)   SpO2 98%   BMI 40.41 kg/m    Body mass index is 40.41 kg/m .  Physical Exam   GENERAL: healthy, alert and no distress  NECK: no adenopathy, no asymmetry, masses, or scars and thyroid normal to palpation  RESP: lungs clear to auscultation - no rales, rhonchi or wheezes  CV: regular rate and rhythm, normal S1 S2, no S3 or S4, no murmur, click or rub, no peripheral edema and peripheral pulses strong    Diagnostic Test Results:  Labs reviewed in Epic        Assessment & Plan         1. Hypertension goal BP (blood pressure) < 140/90  BP continues to be uncontrolled, but is currently not taking correct prescriptions.  I refilled the doses he should be taking.  He is going to schedule a physical with his PCP in the next month; will defer labs to that appt.  Recommend cutting out soda and sugary drinks in addition to increasing fruits and vegetables in his diet.  Working on diet changes and weight loss will likely improve GERD as well.    - amLODIPine (NORVASC) 5 MG tablet; Take 1 tablet (5 mg) by mouth daily  Dispense: 90 tablet; Refill: 0  - lisinopril-hydrochlorothiazide (PRINZIDE/ZESTORETIC) 20-25 MG tablet; Take 1 tablet by mouth " daily  Dispense: 90 tablet; Refill: 0       Return in about 2 months (around 9/1/2019) for Physical Exam.    JONE Burnham [facility]

## 2019-07-16 ENCOUNTER — OFFICE VISIT (OUTPATIENT)
Dept: FAMILY MEDICINE | Facility: CLINIC | Age: 49
End: 2019-07-16
Payer: COMMERCIAL

## 2019-07-16 VITALS
SYSTOLIC BLOOD PRESSURE: 146 MMHG | BODY MASS INDEX: 39.17 KG/M2 | OXYGEN SATURATION: 97 % | HEART RATE: 69 BPM | WEIGHT: 315 LBS | HEIGHT: 75 IN | TEMPERATURE: 98.3 F | DIASTOLIC BLOOD PRESSURE: 76 MMHG

## 2019-07-16 DIAGNOSIS — R40.0 DAYTIME SOMNOLENCE: ICD-10-CM

## 2019-07-16 DIAGNOSIS — R06.83 SNORING: ICD-10-CM

## 2019-07-16 DIAGNOSIS — Z00.00 ROUTINE GENERAL MEDICAL EXAMINATION AT A HEALTH CARE FACILITY: Primary | ICD-10-CM

## 2019-07-16 DIAGNOSIS — I10 HYPERTENSION GOAL BP (BLOOD PRESSURE) < 140/90: ICD-10-CM

## 2019-07-16 DIAGNOSIS — E66.01 MORBID OBESITY (H): ICD-10-CM

## 2019-07-16 PROCEDURE — 80061 LIPID PANEL: CPT | Performed by: FAMILY MEDICINE

## 2019-07-16 PROCEDURE — 80048 BASIC METABOLIC PNL TOTAL CA: CPT | Performed by: FAMILY MEDICINE

## 2019-07-16 PROCEDURE — 36415 COLL VENOUS BLD VENIPUNCTURE: CPT | Performed by: FAMILY MEDICINE

## 2019-07-16 PROCEDURE — 99396 PREV VISIT EST AGE 40-64: CPT | Performed by: FAMILY MEDICINE

## 2019-07-16 RX ORDER — AMLODIPINE BESYLATE 10 MG/1
10 TABLET ORAL DAILY
Qty: 90 TABLET | Refills: 4 | Status: SHIPPED | OUTPATIENT
Start: 2019-07-16 | End: 2020-07-27

## 2019-07-16 RX ORDER — LISINOPRIL AND HYDROCHLOROTHIAZIDE 20; 25 MG/1; MG/1
1 TABLET ORAL DAILY
Qty: 90 TABLET | Refills: 4 | Status: SHIPPED | OUTPATIENT
Start: 2019-07-16 | End: 2020-07-27

## 2019-07-16 ASSESSMENT — ENCOUNTER SYMPTOMS
PARESTHESIAS: 0
EYE PAIN: 0
WEAKNESS: 0
NAUSEA: 0
NERVOUS/ANXIOUS: 0
DIZZINESS: 0
JOINT SWELLING: 0
SORE THROAT: 0
CHILLS: 0
HEADACHES: 0
HEARTBURN: 0
HEMATOCHEZIA: 0
DYSURIA: 0
DIARRHEA: 0
MYALGIAS: 0
HEMATURIA: 0
FREQUENCY: 0
COUGH: 0
ABDOMINAL PAIN: 0
FEVER: 0
ARTHRALGIAS: 0
CONSTIPATION: 0
SHORTNESS OF BREATH: 0
PALPITATIONS: 0

## 2019-07-16 ASSESSMENT — MIFFLIN-ST. JEOR: SCORE: 2383.3

## 2019-07-16 NOTE — PROGRESS NOTES
SUBJECTIVE:   CC: Jonathan Horn is an 49 year old male who presents for preventative health visit.     Healthy Habits:     Getting at least 3 servings of Calcium per day:  Yes    Bi-annual eye exam:  NO    Dental care twice a year:  NO    Sleep apnea or symptoms of sleep apnea:  Daytime drowsiness    Diet:  Low salt    Frequency of exercise:  None    Taking medications regularly:  Yes    Medication side effects:  None    PHQ-2 Total Score: 0    Additional concerns today:  No    History of hypertension. Uncontrolled with lisinopril-hydrochlorothiazide and amlodipine. His blood pressure outside of the clinic has been elevated as well. Denies chest pain, heart palpitations, peripheral edema, shortness of breath, lightheadedness, or vision changes.     History of GERD. Managed with Zantac as needed.     Today's PHQ-2 Score:   PHQ-2 ( 1999 Pfizer) 7/16/2019   Q1: Little interest or pleasure in doing things 0   Q2: Feeling down, depressed or hopeless 0   PHQ-2 Score 0   Q1: Little interest or pleasure in doing things Not at all   Q2: Feeling down, depressed or hopeless Not at all   PHQ-2 Score 0     Abuse: Current or Past(Physical, Sexual or Emotional)- No  Do you feel safe in your environment? Yes    Social History     Tobacco Use     Smoking status: Never Smoker     Smokeless tobacco: Former User     Types: Chew     Tobacco comment: quit chew 2 months ago   Substance Use Topics     Alcohol use: Yes     Comment: social     Alcohol Use 7/16/2019   Prescreen: >3 drinks/day or >7 drinks/week? No   Prescreen: >3 drinks/day or >7 drinks/week? -     Last PSA: No results found for: PSA    Reviewed orders with patient. Reviewed health maintenance and updated orders accordingly - Yes  Patient Active Problem List   Diagnosis     GERD (gastroesophageal reflux disease)     Hypertension goal BP (blood pressure) < 140/90     Achilles tendinitis of both lower extremities     Morbid obesity (H)     Past Surgical History:   Procedure  Laterality Date     COLONOSCOPY  6/14/2016    Dr. Oliva [facility]     COLONOSCOPY N/A 6/14/2016    Procedure: COLONOSCOPY;  Surgeon: Florencio Oliva MD;  Location:  GI     HAND SURGERY  1990    left hand surgery-reattach nerve       Social History     Tobacco Use     Smoking status: Never Smoker     Smokeless tobacco: Former User     Types: Chew     Tobacco comment: quit chew 2 months ago   Substance Use Topics     Alcohol use: Yes     Comment: social     Family History   Problem Relation Age of Onset     Hypertension Father      Cancer - colorectal Father 70     Breast Cancer Mother      Cancer Maternal Grandmother      Diabetes Maternal Grandmother      Cancer Maternal Grandfather      Cancer Paternal Grandmother      Cancer Paternal Grandfather      Cancer - colorectal Paternal Grandfather 55           Reviewed and updated as needed this visit by clinical staff  Tobacco  Allergies  Meds  Med Hx  Surg Hx  Fam Hx  Soc Hx        Reviewed and updated as needed this visit by Provider        Past Medical History:   Diagnosis Date     GERD (gastroesophageal reflux disease) 8/13/2012     Hypertension goal BP (blood pressure) < 140/90 8/13/2012      Past Surgical History:   Procedure Laterality Date     COLONOSCOPY  6/14/2016    Dr. Oliva [facility]     COLONOSCOPY N/A 6/14/2016    Procedure: COLONOSCOPY;  Surgeon: Florencio Oliva MD;  Location:  GI     HAND SURGERY  1990    left hand surgery-reattach nerve       Review of Systems   Constitutional: Negative for chills and fever.   HENT: Positive for hearing loss. Negative for congestion, ear pain and sore throat.    Eyes: Positive for visual disturbance. Negative for pain.   Respiratory: Negative for cough and shortness of breath.    Cardiovascular: Negative for chest pain and palpitations.   Gastrointestinal: Negative for abdominal pain, constipation, diarrhea, heartburn, hematochezia and nausea.   Genitourinary: Positive for impotence. Negative for discharge,  "dysuria, frequency, genital sores, hematuria and urgency.   Musculoskeletal: Negative for arthralgias, joint swelling and myalgias.   Skin: Negative for rash.   Neurological: Negative for dizziness, weakness, headaches and paresthesias.   Psychiatric/Behavioral: Negative for mood changes. The patient is not nervous/anxious.      This document serves as a record of the services and decisions personally performed and made by Joseph Rubio MD. It was created on his behalf by Ramonita Acharya, a trained medical scribe. The creation of this document is based on the provider's statements to the medical scribe.  Ramonita Acharya 3:31 PM July 16, 2019    OBJECTIVE:   /76   Pulse 69   Temp 98.3  F (36.8  C) (Oral)   Ht 1.892 m (6' 2.5\")   Wt 144.6 kg (318 lb 11.2 oz)   SpO2 97%   BMI 40.37 kg/m      Physical Exam  GENERAL: healthy, alert and no distress  EYES: Eyes grossly normal to inspection, PERRL and conjunctivae and sclerae normal  HENT: ear canals and TM's normal, nose and mouth without ulcers or lesions  NECK: no adenopathy, no asymmetry, masses, or scars and thyroid normal to palpation  RESP: lungs clear to auscultation - no rales, rhonchi or wheezes  CV: regular rate and rhythm, normal S1 S2, no S3 or S4, no murmur, click or rub, no peripheral edema and peripheral pulses strong  ABDOMEN: soft, nontender, no hepatosplenomegaly, no masses and bowel sounds normal   (male): normal male genitalia without lesions or urethral discharge, no hernia  MS: no gross musculoskeletal defects noted, no edema  SKIN: no suspicious lesions or rashes  NEURO: Normal strength and tone, mentation intact and speech normal  PSYCH: mentation appears normal, affect normal/bright    Diagnostic Test Results:  Labs reviewed in Epic  No results found for this or any previous visit (from the past 24 hour(s)).    ASSESSMENT/PLAN:   1. Routine general medical examination at a health care facility  - Lipid panel reflex to direct LDL " "Fasting    2. Hypertension goal BP (blood pressure) < 140/90  Amlodipine increased to 10 mg daily.  Recommend sleep study.  Recheck nurse blood pressure check after sleep study is done and on treatment if indicated.  - amLODIPine (NORVASC) 10 MG tablet; Take 1 tablet (10 mg) by mouth daily  Dispense: 90 tablet; Refill: 4  - lisinopril-hydrochlorothiazide (PRINZIDE/ZESTORETIC) 20-25 MG tablet; Take 1 tablet by mouth daily  Dispense: 90 tablet; Refill: 4  - SLEEP EVALUATION & MANAGEMENT REFERRAL - [facility]  [phone] (Age 18 and up); Future  - Basic metabolic panel    3. Snoring    4. Daytime somnolence  - SLEEP EVALUATION & MANAGEMENT REFERRAL - [facility]  [phone] (Age 18 and up); Future    5. Morbid obesity (H)      COUNSELING:   Reviewed preventive health counseling, as reflected in patient instructions  Special attention given to:        Regular exercise       Healthy diet/nutrition       HIV screeninx in teen years, 1x in adult years, and at intervals if high risk    Estimated body mass index is 40.37 kg/m  as calculated from the following:    Height as of this encounter: 1.892 m (6' 2.5\").    Weight as of this encounter: 144.6 kg (318 lb 11.2 oz).     Weight management plan: Discussed healthy diet and exercise guidelines     reports that he has never smoked. He has quit using smokeless tobacco. His smokeless tobacco use included chew.      Counseling Resources:  ATP IV Guidelines  Pooled Cohorts Equation Calculator  FRAX Risk Assessment  ICSI Preventive Guidelines  Dietary Guidelines for Americans,   USDA's MyPlate  ASA Prophylaxis  Lung CA Screening    The information in this document, created by the medical scribe for me, accurately reflects the services I personally performed and the decisions made by me. I have reviewed and approved this document for accuracy prior to leaving the patient care area.  2019 3:53 " PM    Joseph Rubio MD  [facility]

## 2019-07-17 LAB
ANION GAP SERPL CALCULATED.3IONS-SCNC: 10 MMOL/L (ref 3–14)
BUN SERPL-MCNC: 22 MG/DL (ref 7–30)
CALCIUM SERPL-MCNC: 9 MG/DL (ref 8.5–10.1)
CHLORIDE SERPL-SCNC: 105 MMOL/L (ref 94–109)
CHOLEST SERPL-MCNC: 195 MG/DL
CO2 SERPL-SCNC: 23 MMOL/L (ref 20–32)
CREAT SERPL-MCNC: 0.86 MG/DL (ref 0.66–1.25)
GFR SERPL CREATININE-BSD FRML MDRD: >90 ML/MIN/{1.73_M2}
GLUCOSE SERPL-MCNC: 86 MG/DL (ref 70–99)
HDLC SERPL-MCNC: 51 MG/DL
LDLC SERPL CALC-MCNC: 120 MG/DL
NONHDLC SERPL-MCNC: 144 MG/DL
POTASSIUM SERPL-SCNC: 4 MMOL/L (ref 3.4–5.3)
SODIUM SERPL-SCNC: 138 MMOL/L (ref 133–144)
TRIGL SERPL-MCNC: 120 MG/DL

## 2019-09-30 ENCOUNTER — HEALTH MAINTENANCE LETTER (OUTPATIENT)
Age: 49
End: 2019-09-30

## 2019-10-08 ENCOUNTER — TELEPHONE (OUTPATIENT)
Dept: FAMILY MEDICINE | Facility: CLINIC | Age: 49
End: 2019-10-08

## 2019-10-08 NOTE — TELEPHONE ENCOUNTER
Panel Management Review      Patient has the following on his problem list:     Hypertension   Last three blood pressure readings:  BP Readings from Last 3 Encounters:   07/16/19 146/76   07/01/19 140/82   04/15/19 139/90     Blood pressure: FAILED    HTN Guidelines:  Less than 140/90      Composite cancer screening  Chart review shows that this patient is due/due soon for the following None  Summary:    Patient is due/failing the following:   BP CHECK    Action needed:   Patient needs nurse only BLOOD PRESSURE appointment.    Type of outreach:    Sent Spiracur message.    Questions for provider review:    None                                                                                                                                    Lisa Magill, CMA       Chart routed to Care Team .

## 2019-10-08 NOTE — LETTER
[facility]  [id number] [place] [address], [address]  [facility] [id number] [phone]  October 23, 2019    Jonathan Horn  [address] [facility] [address]      Dear Enrike,    I care about your health and have reviewed your health plan.  I have reviewed your medical conditions, medication list, and lab results and am making recommendations  based on this review, to better manage your health.    You are particularly in need of attention regarding:  -High Blood Pressure    I am recommending that you:  -schedule a NURSE-ONLY BLOOD PRESSURE APPOINTMENT within the next 1-4 weeks.    Here is a list of Health Maintenance topics that are due now or due soon:  Health Maintenance Due   Topic Date Due     INFLUENZA VACCINE (1) 09/01/2019     ZOSTER IMMUNIZATION (1 of 2) 05/19/2019       Please call us at [phone] (or use [facility]) to address the above   recommendations.    Thank you for trusting [facility] and we appreciate the opportunity to serve you.  We look forward to supporting your healthcare needs in the future.    Healthy Regards,    Judith Patrick NP/lm

## 2019-10-16 NOTE — TELEPHONE ENCOUNTER
2nd attempt.  Attempted calling patient.  Unable to leave a message due to a voice mailbox not set up yet.  Lisa Magill, CMA

## 2020-08-26 ENCOUNTER — OFFICE VISIT (OUTPATIENT)
Dept: FAMILY MEDICINE | Facility: CLINIC | Age: 50
End: 2020-08-26
Payer: COMMERCIAL

## 2020-08-26 VITALS
RESPIRATION RATE: 20 BRPM | TEMPERATURE: 98.3 F | HEART RATE: 68 BPM | HEIGHT: 75 IN | DIASTOLIC BLOOD PRESSURE: 70 MMHG | OXYGEN SATURATION: 97 % | WEIGHT: 315 LBS | BODY MASS INDEX: 39.17 KG/M2 | SYSTOLIC BLOOD PRESSURE: 130 MMHG

## 2020-08-26 DIAGNOSIS — I10 HYPERTENSION GOAL BP (BLOOD PRESSURE) < 140/90: ICD-10-CM

## 2020-08-26 DIAGNOSIS — I10 ESSENTIAL HYPERTENSION: Primary | ICD-10-CM

## 2020-08-26 DIAGNOSIS — R41.840 ATTENTION OR CONCENTRATION DEFICIT: ICD-10-CM

## 2020-08-26 DIAGNOSIS — R06.83 SNORING: ICD-10-CM

## 2020-08-26 DIAGNOSIS — E66.01 MORBID OBESITY (H): ICD-10-CM

## 2020-08-26 PROCEDURE — 99214 OFFICE O/P EST MOD 30 MIN: CPT | Performed by: FAMILY MEDICINE

## 2020-08-26 PROCEDURE — 80048 BASIC METABOLIC PNL TOTAL CA: CPT | Performed by: FAMILY MEDICINE

## 2020-08-26 PROCEDURE — 36415 COLL VENOUS BLD VENIPUNCTURE: CPT | Performed by: FAMILY MEDICINE

## 2020-08-26 RX ORDER — LISINOPRIL AND HYDROCHLOROTHIAZIDE 20; 25 MG/1; MG/1
1 TABLET ORAL DAILY
Qty: 90 TABLET | Refills: 4 | Status: SHIPPED | OUTPATIENT
Start: 2020-08-26 | End: 2021-11-23

## 2020-08-26 RX ORDER — AMLODIPINE BESYLATE 10 MG/1
10 TABLET ORAL DAILY
Qty: 90 TABLET | Refills: 4 | Status: SHIPPED | OUTPATIENT
Start: 2020-08-26 | End: 2021-11-23

## 2020-08-26 RX ORDER — BUPROPION HYDROCHLORIDE 150 MG/1
150 TABLET ORAL EVERY MORNING
Qty: 30 TABLET | Refills: 1 | Status: SHIPPED | OUTPATIENT
Start: 2020-08-26 | End: 2021-05-24

## 2020-08-26 ASSESSMENT — MIFFLIN-ST. JEOR: SCORE: 2386.47

## 2020-08-26 NOTE — PROGRESS NOTES
Subjective     Jonathan Horn is a 50 year old male who presents to clinic today for the following health issues:    History of Present Illness        Hypertension: He presents for follow up of hypertension.  He does not check blood pressure  regularly outside of the clinic. Outside blood pressures have been over 140/90. He does not follow a low salt diet.     He eats 0-1 servings of fruits and vegetables daily.He consumes 3 sweetened beverage(s) daily.He exercises with enough effort to increase his heart rate 60 or more minutes per day.  He exercises with enough effort to increase his heart rate 5 days per week.   He is taking medications regularly.     Hypertension Follow-up      Do you check your blood pressure regularly outside of the clinic? No     Are you following a low salt diet? Yes    Are your blood pressures ever more than 140 on the top number (systolic) OR more   than 90 on the bottom number (diastolic), for example 140/90? NA      How many servings of fruits and vegetables do you eat daily?  0-1    On average, how many sweetened beverages do you drink each day (Examples: soda, juice, sweet tea, etc.  Do NOT count diet or artificially sweetened beverages)?   3    How many days per week do you exercise enough to make your heart beat faster? 5 for work     How many minutes a day do you exercise enough to make your heart beat faster? 60 or more 7 hrs a day     How many days per week do you miss taking your medication? 0    History of hypertension taking lisinopril-hydrochlorothiazide and amlodipine.  Denies chest pain, heart palpitations, peripheral edema, shortness of breath, lightheadedness, or vision changes.     History of GERD. Managed with Zantac as needed.    Patient with concerns about attention and focus when doing coursework for work advancement position.  Has had attention issues dating back to high school and prior.  Son with attention deficit disorder.  Denies significant anxiety or  "depression.  No history of head injury.  Has had concerns about this issue for years but has worked in career that has made it a nonissue.    Review of Systems   RESP:NEGATIVE for significant cough or SOB  CV: NEGATIVE for chest pain, palpitations or peripheral edema  PSYCHIATRIC: as above      Objective    /70 (BP Location: Right arm, Patient Position: Chair, Cuff Size: Adult Large)   Pulse 68   Temp 98.3  F (36.8  C) (Oral)   Resp 20   Ht 1.892 m (6' 2.5\")   Wt 145.4 kg (320 lb 8 oz)   SpO2 97%   BMI 40.60 kg/m    Body mass index is 40.6 kg/m .  Physical Exam   GENERAL: alert, no distress and obese  RESP: lungs clear to auscultation - no rales, rhonchi or wheezes  CV: regular rate and rhythm, normal S1 S2, no S3 or S4, no murmur, click or rub, no peripheral edema and peripheral pulses strong  PSYCH: mentation appears normal, affect normal/bright            Assessment & Plan     Essential hypertension  Currently controlled, continue current medication.  - Basic metabolic panel  (Ca, Cl, CO2, Creat, Gluc, K, Na, BUN)  - SLEEP EVALUATION & MANAGEMENT REFERRAL - [facility]  [phone] (Age 18 and up) (Call 10-12 noon ideally); Future    Morbid obesity (H)  Discussed improving diet, eating fruits, vegetables, and whole grains, as well as increasing physical activity to 150 minutes of moderate physical activity weekly.  - SLEEP EVALUATION & MANAGEMENT REFERRAL - [facility]  [phone] (Age 18 and up) (Call 10-12 noon ideally); Future    Hypertension goal BP (blood pressure) < 140/90  - amLODIPine (NORVASC) 10 MG tablet; Take 1 tablet (10 mg) by mouth daily  - lisinopril-hydrochlorothiazide (ZESTORETIC) 20-25 MG tablet; Take 1 tablet by mouth daily    Attention or concentration deficit  Discussed provisional diagnosis of likely attention deficit given his history and family history.  With hypertension not appropriate for first-line use of " "stimulant medication.  Discussed consider non-stimulant medication trial.  If not effective recommend follow-up with psychiatry for further testing.  Recommend sleep study as well as this may be confounding factor.  - buPROPion (WELLBUTRIN XL) 150 MG 24 hr tablet; Take 1 tablet (150 mg) by mouth every morning    Snoring  - SLEEP EVALUATION & MANAGEMENT REFERRAL - ADULT -[facility]  [phone] (Age 18 and up) (Call 10-12 noon ideally); Future     BMI:   Estimated body mass index is 40.6 kg/m  as calculated from the following:    Height as of this encounter: 1.892 m (6' 2.5\").    Weight as of this encounter: 145.4 kg (320 lb 8 oz).   Weight management plan: Discussed healthy diet and exercise guidelines            Return in about 1 year (around 8/26/2021) for medication recheck, routine physical.    Joseph Rubio MD  [facility]      "

## 2020-08-26 NOTE — PATIENT INSTRUCTIONS
Shingles vaccine - Shingrix - call your insurance company to see if this is covered and if this has better coverage at the pharmacy or the clinic.

## 2020-08-27 LAB
ANION GAP SERPL CALCULATED.3IONS-SCNC: 6 MMOL/L (ref 3–14)
BUN SERPL-MCNC: 17 MG/DL (ref 7–30)
CALCIUM SERPL-MCNC: 9.3 MG/DL (ref 8.5–10.1)
CHLORIDE SERPL-SCNC: 103 MMOL/L (ref 94–109)
CO2 SERPL-SCNC: 27 MMOL/L (ref 20–32)
CREAT SERPL-MCNC: 0.95 MG/DL (ref 0.66–1.25)
GFR SERPL CREATININE-BSD FRML MDRD: >90 ML/MIN/{1.73_M2}
GLUCOSE SERPL-MCNC: 102 MG/DL (ref 70–99)
POTASSIUM SERPL-SCNC: 4.4 MMOL/L (ref 3.4–5.3)
SODIUM SERPL-SCNC: 138 MMOL/L (ref 133–144)

## 2020-11-11 ENCOUNTER — OFFICE VISIT (OUTPATIENT)
Dept: URGENT CARE | Facility: URGENT CARE | Age: 50
End: 2020-11-11

## 2020-11-11 VITALS
TEMPERATURE: 98.4 F | OXYGEN SATURATION: 95 % | HEART RATE: 76 BPM | SYSTOLIC BLOOD PRESSURE: 140 MMHG | BODY MASS INDEX: 39.57 KG/M2 | DIASTOLIC BLOOD PRESSURE: 76 MMHG | WEIGHT: 312.4 LBS

## 2020-11-11 DIAGNOSIS — M25.511 ACUTE PAIN OF RIGHT SHOULDER: Primary | ICD-10-CM

## 2020-11-11 PROCEDURE — 99213 OFFICE O/P EST LOW 20 MIN: CPT | Performed by: PHYSICIAN ASSISTANT

## 2020-11-11 RX ORDER — METHYLPREDNISOLONE 4 MG
TABLET, DOSE PACK ORAL
Qty: 21 TABLET | Refills: 0 | Status: SHIPPED | OUTPATIENT
Start: 2020-11-11 | End: 2021-05-24

## 2020-11-11 ASSESSMENT — ENCOUNTER SYMPTOMS: FEVER: 0

## 2020-11-11 NOTE — LETTER
November 11, 2020      Jonathan REYMUNDO Horn  [address] [facility] [address]        To Whom It May Concern:    Jonathan Horn  was seen on 11/11/2020  Please excuse his absence from work 11/12/2020 and 11/13/2020 due to acute right shoulder pain.        Sincerely,        Lesa Duenas PA-C

## 2020-11-12 NOTE — PATIENT INSTRUCTIONS
Patient Education     Understanding Shoulder Impingement Syndrome    Shoulder impingement syndrome is a problem with the shoulder joint. It occurs when certain parts within the joint swell and are pinched. This can cause nagging pain and problems with moving the arm.   What causes shoulder impingement syndrome?  It's possible to develop impingement after years of normal shoulder use. But in most cases, the condition occurs because of repeated overhead movements. These include such things as stocking shelves, painting, swimming, and throwing. These movements can irritate parts of the shoulder, leading to swelling. Swollen parts of the shoulder take up more room, making the joint space smaller. Some parts that may be involved include:     A sac of fluid (bursa) that cushions the shoulder joint.  When the bursa is irritated, it may lead to a condition called bursitis. This is when the bursa swells with fluid, filling and squeezing the joint space.    Fibrous tissues (tendons) that connect muscle to bone.  When tendons are irritated, they may become swollen. This is called tendonitis.    The end (acromion) of the shoulder blade. This bone may be flat or hooked. If the acromion is hooked, the joint space may be smaller than normal. Growths (bone spurs) on the acromion can also narrow the joint space. Acromion problems don t often cause impingement. But they can make it worse.  Symptoms of shoulder impingement syndrome  Symptoms include pain, pinching, or stiffness in your shoulder. Pain often comes with movement, particularly reaching overhead or backward. It may also be felt when the shoulder is at rest. Pain at night during sleep is common.   Treatment for shoulder impingement syndrome  Treatment will depend on the cause of the problem, how bad the problem is, and if other parts of the shoulder are damaged. Treatment may include the following:     Active rest. This lets the shoulder to heal. It means using the arm  and shoulder, but avoiding activities that cause pain. These likely include reaching overhead or sleeping on your shoulder.    Cold packs. These help reduce swelling and relieve pain.    Prescription or over-the-counter medicines.  These help relieve pain and swelling. NSAIDs (nonsteroidal anti-inflammatory drugs) are the most common medicines used. Medicines may be prescribed or bought over the counter. They may be given as pills. Or they may be put on the skin as a gel, cream, or patch.    Arm and shoulder exercises. These help keep your shoulder joint mobile as it heals. They also help improve muscle strength around the joint.    Shots of medicine into the joint.  This can help reduce swelling and pain. The medicine used is usually a corticosteroid. This is a strong anti-inflammatory medicine.  If other measures don t work to relieve symptoms, you may need surgery. This opens up space in the joint to allow pain-free motion.   Possible complications of shoulder impingement syndrome  It might be tempting to stop using your shoulder completely to avoid pain. But doing so may lead to a condition called frozen shoulder. To help prevent this, follow instructions you are given for active rest and for doing exercises to help your shoulder heal.   When to call your healthcare provider  Call your healthcare provider right away if you have any of these:    Fever of 100.4 F (38 C) or higher, or as directed by your provider    Chills    Symptoms that don t get better, or get worse    New symptoms  [place] last reviewed this educational content on 6/1/2019 2000-2020 [employer]. [address] [place] [address], [place] [address]. All rights reserved. This information is not intended as a substitute for professional medical care. Always follow your healthcare professional's instructions.

## 2020-11-12 NOTE — PROGRESS NOTES
SUBJECTIVE:   Jonathan Horn is a 50 year old male presenting with a chief complaint of   Chief Complaint   Patient presents with     Urgent Care     Work Comp     Right shoulder injured at work x5days ago-limited Mobility        He is an established patient of [facility].    MS Injury/Pain    Onset of symptoms was 5 day(s) ago. This is a work comp. Onset of symptoms 11/6/2020. 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  Location: right shoulder  Context:       The injury happened while at work      Mechanism: inadvertently dropped a heavy items and felt pull from shoulder.      Patient experienced immediate pain  Course of symptoms is same.    Severity moderate  Current and Associated symptoms: Pain, Tenderness and Decreased range of motion  Denies  Swelling, Bruising, Warmth and Redness  Aggravating Factors: movement  Therapies to improve symptoms include: ice  This is not the first time this type of problem has occurred for this patient. Has had similar shoulder pain in the past but typically only last for a couple days then resolve.      Review of Systems   Constitutional: Negative for fever.   Musculoskeletal:        Right shoulder pain       Past Medical History:   Diagnosis Date     GERD (gastroesophageal reflux disease) 8/13/2012     Hypertension goal BP (blood pressure) < 140/90 8/13/2012     Family History   Problem Relation Age of Onset     Hypertension Father      Cancer - colorectal Father 70     Breast Cancer Mother      Cancer Maternal Grandmother      Diabetes Maternal Grandmother      Cancer Maternal Grandfather      Cancer Paternal Grandmother      Cancer Paternal Grandfather      Cancer - colorectal Paternal Grandfather 55     Current Outpatient Medications   Medication Sig Dispense Refill     amLODIPine (NORVASC) 10 MG tablet Take 1 tablet (10 mg) by mouth daily 90 tablet 4     lisinopril-hydrochlorothiazide (ZESTORETIC) 20-25 MG tablet Take 1  tablet by mouth daily 90 tablet 4     methylPREDNISolone (MEDROL DOSEPAK) 4 MG tablet therapy pack Follow Package Directions 21 tablet 0     buPROPion (WELLBUTRIN XL) 150 MG 24 hr tablet Take 1 tablet (150 mg) by mouth every morning (Patient not taking: Reported on 11/11/2020) 30 tablet 1     RaNITidine HCl (ZANTAC PO) Take 150 mg by mouth       Social History     Tobacco Use     Smoking status: Never Smoker     Smokeless tobacco: Former User     Types: Chew     Tobacco comment: quit chew 2 months ago   Substance Use Topics     Alcohol use: Yes     Comment: social       OBJECTIVE  BP (!) 140/76 (BP Location: Right arm, Patient Position: Sitting, Cuff Size: Adult Large)   Pulse 76   Temp 98.4  F (36.9  C) (Tympanic)   Wt 141.7 kg (312 lb 6.4 oz)   SpO2 95%   BMI 39.57 kg/m      Physical Exam  Vitals signs and nursing note reviewed.   Constitutional:       General: He is not in acute distress.     Appearance: He is well-developed.   HENT:      Head: Normocephalic and atraumatic.      Right Ear: External ear normal.      Left Ear: External ear normal.   Eyes:      Conjunctiva/sclera: Conjunctivae normal.   Neck:      Musculoskeletal: Normal range of motion.   Pulmonary:      Effort: Pulmonary effort is normal. No respiratory distress.      Breath sounds: Normal breath sounds.   Musculoskeletal:         General: Tenderness present. No swelling or deformity.      Comments: Right shoulder exam: No gross deformities, swelling or ecchymosis noted.  Positive Neer's.    Skin:     General: Skin is warm and dry.   Neurological:      Mental Status: He is alert.         Labs:  No results found for this or any previous visit (from the past 24 hour(s)).    X-Ray was not done.    ASSESSMENT:      ICD-10-CM    1. Acute pain of right shoulder  M25.511 methylPREDNISolone (MEDROL DOSEPAK) 4 MG tablet therapy pack          PLAN:    Acute right shoulder pain: Suspect shoulder rotator cuff tendinitis.  Medrol Dosepak is prescribed.   Keep monitoring symptoms.  Follow-up if any worsening symptoms.  Patient agrees with the plan.    Followup:    If not improving or if condition worsens, follow up with your Primary Care Provider    Patient Instructions     Patient Education     Understanding Shoulder Impingement Syndrome    Shoulder impingement syndrome is a problem with the shoulder joint. It occurs when certain parts within the joint swell and are pinched. This can cause nagging pain and problems with moving the arm.   What causes shoulder impingement syndrome?  It's possible to develop impingement after years of normal shoulder use. But in most cases, the condition occurs because of repeated overhead movements. These include such things as stocking shelves, painting, swimming, and throwing. These movements can irritate parts of the shoulder, leading to swelling. Swollen parts of the shoulder take up more room, making the joint space smaller. Some parts that may be involved include:     A sac of fluid (bursa) that cushions the shoulder joint.  When the bursa is irritated, it may lead to a condition called bursitis. This is when the bursa swells with fluid, filling and squeezing the joint space.    Fibrous tissues (tendons) that connect muscle to bone.  When tendons are irritated, they may become swollen. This is called tendonitis.    The end (acromion) of the shoulder blade. This bone may be flat or hooked. If the acromion is hooked, the joint space may be smaller than normal. Growths (bone spurs) on the acromion can also narrow the joint space. Acromion problems don t often cause impingement. But they can make it worse.  Symptoms of shoulder impingement syndrome  Symptoms include pain, pinching, or stiffness in your shoulder. Pain often comes with movement, particularly reaching overhead or backward. It may also be felt when the shoulder is at rest. Pain at night during sleep is common.   Treatment for shoulder impingement syndrome  Treatment  will depend on the cause of the problem, how bad the problem is, and if other parts of the shoulder are damaged. Treatment may include the following:     Active rest. This lets the shoulder to heal. It means using the arm and shoulder, but avoiding activities that cause pain. These likely include reaching overhead or sleeping on your shoulder.    Cold packs. These help reduce swelling and relieve pain.    Prescription or over-the-counter medicines.  These help relieve pain and swelling. NSAIDs (nonsteroidal anti-inflammatory drugs) are the most common medicines used. Medicines may be prescribed or bought over the counter. They may be given as pills. Or they may be put on the skin as a gel, cream, or patch.    Arm and shoulder exercises. These help keep your shoulder joint mobile as it heals. They also help improve muscle strength around the joint.    Shots of medicine into the joint.  This can help reduce swelling and pain. The medicine used is usually a corticosteroid. This is a strong anti-inflammatory medicine.  If other measures don t work to relieve symptoms, you may need surgery. This opens up space in the joint to allow pain-free motion.   Possible complications of shoulder impingement syndrome  It might be tempting to stop using your shoulder completely to avoid pain. But doing so may lead to a condition called frozen shoulder. To help prevent this, follow instructions you are given for active rest and for doing exercises to help your shoulder heal.   When to call your healthcare provider  Call your healthcare provider right away if you have any of these:    Fever of 100.4 F (38 C) or higher, or as directed by your provider    Chills    Symptoms that don t get better, or get worse    New symptoms  [employer] last reviewed this educational content on 6/1/2019 2000-2020 [employer]. [address] [place] [address], [place] [address]. All rights reserved. This information is not intended as a substitute  for professional medical care. Always follow your healthcare professional's instructions.

## 2021-01-15 ENCOUNTER — HEALTH MAINTENANCE LETTER (OUTPATIENT)
Age: 51
End: 2021-01-15

## 2021-01-29 NOTE — MR AVS SNAPSHOT
"              After Visit Summary   10/11/2018    Jonathan Horn    MRN: [id number]           Patient Information     Date Of Birth          1970        Visit Information        Provider Department      10/11/2018 8:40 AM Cody Blackburn PA-C [facility]        Today's Diagnoses     Hypertension goal BP (blood pressure) < 140/90           Follow-ups after your visit        Follow-up notes from your care team     Return in about 2 weeks (around 10/25/2018) for BP Recheck with Nurse.      Who to contact     If you have questions or need follow up information about today's clinic visit or your schedule please contact [facility] directly at [phone].  Normal or non-critical lab and imaging results will be communicated to you by [employer], letter or phone within 4 business days after the clinic has received the results. If you do not hear from us within 7 days, please contact the clinic through [employer] or phone. If you have a critical or abnormal lab result, we will notify you by phone as soon as possible.  Submit refill requests through [employer] or call your pharmacy and they will forward the refill request to us. Please allow 3 business days for your refill to be completed.          Additional Information About Your Visit        MyChart Information     [employer] gives you secure access to your electronic health record. If you see a primary care provider, you can also send messages to your care team and make appointments. If you have questions, please call your primary care clinic.  If you do not have a primary care provider, please call [phone] and they will assist you.        Care EveryWhere ID     This is your Care EveryWhere ID. This could be used by other organizations to access your [facility] medical records  [id number]        Your Vitals Were     Pulse Temperature Respirations Height BMI (Body Mass Index)       60 97.9  F (36.6  C) (Oral) 16 6' 2.5\" (1.892 m) " Addended by: LAAN SMITH on: 1/29/2021 11:51 AM     Modules accepted: Orders     40.66 kg/m2        Blood Pressure from Last 3 Encounters:   10/11/18 150/85   04/17/18 137/84   08/10/17 128/72    Weight from Last 3 Encounters:   10/11/18 321 lb (145.6 kg)   04/17/18 321 lb 9.6 oz (145.9 kg)   08/10/17 292 lb 4.8 oz (132.6 kg)              We Performed the Following     BASIC METABOLIC PANEL          Where to get your medicines      These medications were sent to [facility]/pharmacy #[id number] - [place], [place] - [address]  [id number]  [facility] [address]     Phone:  [phone]     lisinopril-hydrochlorothiazide 20-12.5 MG per tablet          Primary Care Provider Office Phone # Fax #    Joseph Rubio [phone] [address]  [facility] [address]        Equal Access to Services     [facility]: Hadii aad renay hadasho Soaylinali, waaxda luqadaha, qaybta kaalmada adeegyada, waxay marcus jenkins . So [facility] [phone].    ATENCIÓN: Si habla español, tiene a rodriguez disposición servicios gratuitos de asistencia lingüística. Kristinaame al [phone].    We comply with applicable federal civil rights laws and [place] laws. We do not discriminate on the basis of race, color, national origin, age, disability, sex, sexual orientation, or gender identity.            Thank you!     Thank you for choosing [facility]  for your care. Our goal is always to provide you with excellent care. Hearing back from our patients is one way we can continue to improve our services. Please take a few minutes to complete the written survey that you may receive in the mail after your visit with us. Thank you!             Your Updated Medication List - Protect others around you: Learn how to safely use, store and throw away your medicines at www.disposemymeds.org.          This list is accurate as of 10/11/18  9:08 AM.  Always use your most recent med list.                   Brand Name Dispense Instructions for use Diagnosis    dexamethasone 4 MG/ML injection     DECADRON    30 mL    To be used topically by therapist during PT sessions.    Achilles tendonitis, bilateral       lisinopril-hydrochlorothiazide 20-12.5 MG per tablet    PRINZIDE/ZESTORETIC    90 tablet    Take 1 tablet by mouth daily    Hypertension goal BP (blood pressure) < 140/90       order for DME     2 Device    Equipment being ordered: size 12 left and right Trilok ankle brace    Achilles tendonitis, bilateral       ZANTAC PO      Take 150 mg by mouth

## 2021-05-24 ENCOUNTER — OFFICE VISIT (OUTPATIENT)
Dept: FAMILY MEDICINE | Facility: CLINIC | Age: 51
End: 2021-05-24
Payer: COMMERCIAL

## 2021-05-24 VITALS
SYSTOLIC BLOOD PRESSURE: 138 MMHG | BODY MASS INDEX: 39.9 KG/M2 | OXYGEN SATURATION: 96 % | WEIGHT: 310.9 LBS | RESPIRATION RATE: 18 BRPM | DIASTOLIC BLOOD PRESSURE: 88 MMHG | HEIGHT: 74 IN | TEMPERATURE: 98.4 F | HEART RATE: 64 BPM

## 2021-05-24 DIAGNOSIS — E66.01 MORBID OBESITY (H): ICD-10-CM

## 2021-05-24 DIAGNOSIS — I10 BENIGN ESSENTIAL HYPERTENSION: ICD-10-CM

## 2021-05-24 DIAGNOSIS — Z01.818 PRE-OPERATIVE GENERAL PHYSICAL EXAMINATION: Primary | ICD-10-CM

## 2021-05-24 LAB
ERYTHROCYTE [DISTWIDTH] IN BLOOD BY AUTOMATED COUNT: 13.2 % (ref 10–15)
HCT VFR BLD AUTO: 43.7 % (ref 40–53)
HGB BLD-MCNC: 14.8 G/DL (ref 13.3–17.7)
MCH RBC QN AUTO: 31 PG (ref 26.5–33)
MCHC RBC AUTO-ENTMCNC: 33.9 G/DL (ref 31.5–36.5)
MCV RBC AUTO: 92 FL (ref 78–100)
PLATELET # BLD AUTO: 215 10E9/L (ref 150–450)
RBC # BLD AUTO: 4.77 10E12/L (ref 4.4–5.9)
WBC # BLD AUTO: 8.6 10E9/L (ref 4–11)

## 2021-05-24 PROCEDURE — 85027 COMPLETE CBC AUTOMATED: CPT | Performed by: FAMILY MEDICINE

## 2021-05-24 PROCEDURE — 80048 BASIC METABOLIC PNL TOTAL CA: CPT | Performed by: FAMILY MEDICINE

## 2021-05-24 PROCEDURE — 93000 ELECTROCARDIOGRAM COMPLETE: CPT | Performed by: FAMILY MEDICINE

## 2021-05-24 PROCEDURE — 99214 OFFICE O/P EST MOD 30 MIN: CPT | Performed by: FAMILY MEDICINE

## 2021-05-24 PROCEDURE — 36415 COLL VENOUS BLD VENIPUNCTURE: CPT | Performed by: FAMILY MEDICINE

## 2021-05-24 ASSESSMENT — MIFFLIN-ST. JEOR: SCORE: 2322.04

## 2021-05-24 NOTE — PROGRESS NOTES
[facility]  [address] [facility] [address]  Phone: [phone]  Primary Provider: Joseph Rubio        PREOPERATIVE EVALUATION:  Today's date: 5/24/2021    Jonathan Horn is a 51 year old male who presents for a preoperative evaluation.    Surgical Information:  Surgery/Procedure: Right arothoscopic shoulder subacromial decompression  Surgery Location:  [facility]  Surgeon: Lorena  Surgery Date: May 28  Time of Surgery: unsure   Where patient plans to recover: At home with family  Fax number for surgical facility: [phone] / [phone]  Type of Anesthesia Anticipated: to be determined    Assessment & Plan     The proposed surgical procedure is considered INTERMEDIATE risk.    Morbid obesity (H)  - discussed maintaining ideal weight   - possibly sleep apnea   - never been tested .      Pre-operative general physical examination  - Undergoing intermediate risk procedure   - physical activity greater than 4 mets   -Low perioperative cardiac risk   - patient is optimized for procedure .    - EKG 12-lead complete w/read - Clinics  - CBC with platelets  - Basic metabolic panel  (Ca, Cl, CO2, Creat, Gluc, K, Na, BUN)    Benign essential hypertension  - Blood pressure at goal   - Recommend to continue Lisinopril - hydrochlorothiazide and amlodipine .    Possible Sleep Apnea: possible sleep apnea .    Risks and Recommendations:  The patient has the following additional risks and recommendations for perioperative complications:   - No identified additional risk factors other than previously addressed    Medication Instructions:  Patient will hold aspirin , multivitamin , ibuprofen .  Will take BP medication with sip of water .      RECOMMENDATION:  APPROVAL GIVEN to proceed with proposed procedure, without further diagnostic evaluation.      Subjective     HPI related to upcoming procedure: undergoing intermediate risk right shoulder arthroscopic  procedure   For right shoulder pain     Preop Questions 5/24/2021   1. Have you ever had a heart attack or stroke? No   2. Have you ever had surgery on your heart or blood vessels, such as a stent placement, a coronary artery bypass, or surgery on an artery in your head, neck, heart, or legs? No   3. Do you have chest pain with activity? No   4. Do you have a history of  heart failure? No   5. Do you currently have a cold, bronchitis or symptoms of other infection? No   6. Do you have a cough, shortness of breath, or wheezing? No   7. Do you or anyone in your family have previous history of blood clots? YES -    8. Do you or does anyone in your family have a serious bleeding problem such as prolonged bleeding following surgeries or cuts? No   9. Have you ever had problems with anemia or been told to take iron pills? No   10. Have you had any abnormal blood loss such as black, tarry or bloody stools? No   11. Have you ever had a blood transfusion? No   12. Are you willing to have a blood transfusion if it is medically needed before, during, or after your surgery? Yes   13. Have you or any of your relatives ever had problems with anesthesia? YES -   14. Do you have sleep apnea, excessive snoring or daytime drowsiness? YES - possible sleep apnea.   14a. Do you have a CPAP machine? No   15. Do you have any artifical heart valves or other implanted medical devices like a pacemaker, defibrillator, or continuous glucose monitor? No   16. Do you have artificial joints? No   17. Are you allergic to latex? No       Health Care Directive:  Patient does not have a Health Care Directive or Living Will: Discussed advance care planning with patient; however, patient declined at this time.    Review of Systems   Constitutional: Negative for fever.   Respiratory: Negative for cough.    Endocrine: Negative for cold intolerance and heat intolerance.   Genitourinary: Negative for dysuria and flank pain.   Neurological: Negative for  "headaches.   Psychiatric/Behavioral: Negative for confusion and decreased concentration.         Patient Active Problem List    Diagnosis Date Noted     Morbid obesity (H) 04/17/2018     Priority: Medium     Achilles tendinitis of both lower extremities 01/26/2017     Priority: Medium     GERD (gastroesophageal reflux disease) 08/13/2012     Priority: Medium      Past Medical History:   Diagnosis Date     GERD (gastroesophageal reflux disease) 8/13/2012     Hypertension goal BP (blood pressure) < 140/90 8/13/2012     Past Surgical History:   Procedure Laterality Date     COLONOSCOPY  6/14/2016    Dr. Oliva [facility]     COLONOSCOPY N/A 6/14/2016    Procedure: COLONOSCOPY;  Surgeon: Florencio Oliva MD;  Location:  GI     HAND SURGERY  1990    left hand surgery-reattach nerve     Current Outpatient Medications   Medication Sig Dispense Refill     amLODIPine (NORVASC) 10 MG tablet Take 1 tablet (10 mg) by mouth daily 90 tablet 4     lisinopril-hydrochlorothiazide (ZESTORETIC) 20-25 MG tablet Take 1 tablet by mouth daily 90 tablet 4     RaNITidine HCl (ZANTAC PO) Take 150 mg by mouth         Allergies   Allergen Reactions     Prilosec [Omeprazole]      Patient states it gives him a lump in his throat        Social History     Tobacco Use     Smoking status: Never Smoker     Smokeless tobacco: Former User     Types: Chew     Tobacco comment: quit chew 2 months ago   Substance Use Topics     Alcohol use: Yes     Comment: social       History   Drug Use No         Objective     /88 (BP Location: Right arm, Patient Position: Chair, Cuff Size: Adult Large)   Pulse 64   Temp 98.4  F (36.9  C) (Oral)   Resp 18   Ht 1.867 m (6' 1.5\")   Wt 141 kg (310 lb 14.4 oz)   SpO2 96%   BMI 40.46 kg/m      Physical Exam  Vitals signs and nursing note reviewed.   Constitutional:       Appearance: Normal appearance.   HENT:      Head: Normocephalic and atraumatic.      Nose: Nose normal.      Mouth/Throat:      Mouth: Mucous " membranes are dry.   Cardiovascular:      Rate and Rhythm: Normal rate and regular rhythm.   Pulmonary:      Effort: Pulmonary effort is normal.   Abdominal:      General: Abdomen is flat.   Skin:     General: Skin is warm.   Neurological:      General: No focal deficit present.      Mental Status: He is alert and oriented to person, place, and time.           Recent Labs   Lab Test 08/26/20  1432 07/16/19  1543    138   POTASSIUM 4.4 4.0   CR 0.95 0.86        Diagnostics:  Recent Results (from the past 168 hour(s))   CBC with platelets    Collection Time: 05/24/21  5:16 PM   Result Value Ref Range    WBC 8.6 4.0 - 11.0 10e9/L    RBC Count 4.77 4.4 - 5.9 10e12/L    Hemoglobin 14.8 13.3 - 17.7 g/dL    Hematocrit 43.7 40.0 - 53.0 %    MCV 92 78 - 100 fl    MCH 31.0 26.5 - 33.0 pg    MCHC 33.9 31.5 - 36.5 g/dL    RDW 13.2 10.0 - 15.0 %    Platelet Count 215 150 - 450 10e9/L   Basic metabolic panel  (Ca, Cl, CO2, Creat, Gluc, K, Na, BUN)    Collection Time: 05/24/21  5:16 PM   Result Value Ref Range    Sodium 138 133 - 144 mmol/L    Potassium 4.1 3.4 - 5.3 mmol/L    Chloride 105 94 - 109 mmol/L    Carbon Dioxide 28 20 - 32 mmol/L    Anion Gap 5 3 - 14 mmol/L    Glucose 86 70 - 99 mg/dL    Urea Nitrogen 21 7 - 30 mg/dL    Creatinine 0.95 0.66 - 1.25 mg/dL    GFR Estimate >90 >60 mL/min/[1.73_m2]    GFR Estimate If Black >90 >60 mL/min/[1.73_m2]    Calcium 8.8 8.5 - 10.1 mg/dL      EKG: appears normal, NSR, normal axis, normal intervals, no acute ST/T changes c/w ischemia, no LVH by voltage criteria, unchanged from previous tracings    Revised Cardiac Risk Index (RCRI):  The patient has the following serious cardiovascular risks for perioperative complications:   - No serious cardiac risks = 0 points     RCRI Interpretation: 0 points: Class I (very low risk - 0.4% complication rate)           Signed Electronically by: Mercedes Orona MD  Copy of this evaluation report is provided to requesting physician.

## 2021-05-26 ENCOUNTER — TELEPHONE (OUTPATIENT)
Dept: FAMILY MEDICINE | Facility: CLINIC | Age: 51
End: 2021-05-26

## 2021-05-26 LAB
ANION GAP SERPL CALCULATED.3IONS-SCNC: 5 MMOL/L (ref 3–14)
BUN SERPL-MCNC: 21 MG/DL (ref 7–30)
CALCIUM SERPL-MCNC: 8.8 MG/DL (ref 8.5–10.1)
CHLORIDE SERPL-SCNC: 105 MMOL/L (ref 94–109)
CO2 SERPL-SCNC: 28 MMOL/L (ref 20–32)
CREAT SERPL-MCNC: 0.95 MG/DL (ref 0.66–1.25)
GFR SERPL CREATININE-BSD FRML MDRD: >90 ML/MIN/{1.73_M2}
GLUCOSE SERPL-MCNC: 86 MG/DL (ref 70–99)
POTASSIUM SERPL-SCNC: 4.1 MMOL/L (ref 3.4–5.3)
SODIUM SERPL-SCNC: 138 MMOL/L (ref 133–144)

## 2021-05-26 ASSESSMENT — ENCOUNTER SYMPTOMS
FEVER: 0
COUGH: 0
HEADACHES: 0
CONFUSION: 0
DYSURIA: 0
DECREASED CONCENTRATION: 0
FLANK PAIN: 0

## 2021-05-26 NOTE — TELEPHONE ENCOUNTER
Called patient and left voicemail to return call to clinic  *see note below*      ----- Message from Mercedes Orona MD sent at 5/26/2021  9:37 AM CDT -----  Team     Please explain normal blood count , electrolyte panel ,kidney function .  Patient is optimized for procedure .    Thanks   Mercedes Orona

## 2021-05-27 NOTE — TELEPHONE ENCOUNTER
Attempted to reach patient by phone, unable to leave voicemail as mailbox not set up. [facility] message sent to patient.     Franko TOURE RN

## 2021-05-28 NOTE — TELEPHONE ENCOUNTER
Attempted to reach patient by phone, unable to leave voicemail as mailbox not set up. Patient's surgery date was listed as 5/28/21 at [facility].     Franko TOURE RN

## 2021-10-24 ENCOUNTER — HEALTH MAINTENANCE LETTER (OUTPATIENT)
Age: 51
End: 2021-10-24

## 2021-11-22 DIAGNOSIS — I10 ESSENTIAL HYPERTENSION: ICD-10-CM

## 2021-11-23 RX ORDER — LISINOPRIL AND HYDROCHLOROTHIAZIDE 20; 25 MG/1; MG/1
TABLET ORAL
Qty: 90 TABLET | Refills: 1 | Status: SHIPPED | OUTPATIENT
Start: 2021-11-23 | End: 2022-02-23

## 2021-11-23 RX ORDER — AMLODIPINE BESYLATE 10 MG/1
TABLET ORAL
Qty: 90 TABLET | Refills: 1 | Status: SHIPPED | OUTPATIENT
Start: 2021-11-23 | End: 2022-02-23

## 2021-11-23 NOTE — TELEPHONE ENCOUNTER
Prescription approved per [facility] Refill Protocol.    Carmen Stroud RN on 11/23/2021 at 2:39 PM

## 2021-11-25 NOTE — PROGRESS NOTES
"Foot & Ankle Surgery  January 27, 2017    CC: bilateral Achilles pain L>R    I was asked to see Jonathan Horn regarding bilateral achilles pain by SHERRY Stafford    HPI:  Pt is a 46 year old male who presents with above complaint.  3 month history of worsening bilateral Achilles pain L>>R, no injury noted.  descirbes burning in left Achilles, pain in both.  Pain 2/10 during the day, not too limiting at work, but states the pain increases at night.  Describes morning pain and PSD.  Has tried stretching, which has been beneficial.  No imaging.  He wore a walking boot, states he also wore it to bed at night, as maintaining a stretch on the tendon helped with discomfort/AM pain.    ROS:   Pos for CC.  The patient denies current nausea, vomiting, chills, fevers, belly pain, calf pain, chest pain or SOB.  Complete remainder of ROS is otherwise neg.    VITALS:    Filed Vitals:    01/27/17 1035   Height: 6' 2\" (1.88 m)   Weight: 323 lb (146.512 kg)       PMH:    Past Medical History   Diagnosis Date     Hypertension goal BP (blood pressure) < 140/90 8/13/2012     GERD (gastroesophageal reflux disease) 8/13/2012       SXHX:    Past Surgical History   Procedure Laterality Date     Hand surgery  1990     left hand surgery-reattach nerve     Colonoscopy  6/14/2016     Dr. Oliva [facility]     Colonoscopy N/A 6/14/2016     Procedure: COLONOSCOPY;  Surgeon: Florencio Oliva MD;  Location:  GI        MEDS:    Current Outpatient Prescriptions   Medication     RaNITidine HCl (ZANTAC PO)     lisinopril-hydrochlorothiazide (PRINZIDE,ZESTORETIC) 20-12.5 MG per tablet     No current facility-administered medications for this visit.       ALL:   No Known Allergies    FMH:    Family History   Problem Relation Age of Onset     Hypertension Father      Cancer - colorectal Father 70     Breast Cancer Mother      CANCER Maternal Grandmother      DIABETES Maternal Grandmother      CANCER Maternal Grandfather      CANCER Paternal Grandmother  "     CANCER Paternal Grandfather      Cancer - colorectal Paternal Grandfather 55       SocHx:    Social History     Social History     Marital Status:      Spouse Name: N/A     Number of Children: N/A     Years of Education: N/A     Occupational History     Not on file.     Social History Main Topics     Smoking status: Never Smoker      Smokeless tobacco: Former User     Types: Chew      Comment: quit chew 2 months ago     Alcohol Use: Yes      Comment: social     Drug Use: No     Sexual Activity:     Partners: Female     Other Topics Concern     Parent/Sibling W/ Cabg, Mi Or Angioplasty Before 65f 55m? No     Social History Narrative           EXAMINATION:  Gen:   No apparent distress  Neuro:   A&Ox3, no deficits  Psych:    Answering questions appropriately for age and situation with normal affect  Head:    NCAT  Eye:    Visual scanning without deficit  Ear:    Response to auditory stimuli wnl  Lung:    Non-labored breathing on RA noted  Abd:    NTND per patient report  Lymph:    Neg for pitting/non-pitting edema BLE  Vasc:    Pulses palpable, CFT minimally delayed  Neuro:    Light touch sensation intact to all sensory nerve distributions without paresthesias  Derm:    Neg for nodules, lesions or ulcerations  MSK:  L>R insertion Achilles tendonitis.  Mild thickening of R achilles at watershed but not symptomatic.  Equinus bilateral e.    Calf:    Neg for redness, swelling or tenderness    Assessment:  46 year old male with Achilles tendonitis bilateral lower extremity       Plan:  Discussed etiologies and options  1.  Achilles tendonitis bilateral lower extremity   -Regarding the achilles tendonitis, the Plantar Fasciitis handout was dispensed and discussed.  We talked about stretching, resting, icing, NSAID use as tolerated, inserts, supportive shoes and minimizing barefoot walking.    -discussed and demonstrated Achilles, plantar fascial and hamstring stretches  -OTC insert and PRICE instructions  dispensed and discussed   -tensogrip, as patient states compression on his posterior heel helps with pain  -discussed brace, CAM, PT, imaging; declined today    Follow up:  4 weeks      Patient's medical history was reviewed today    Body mass index is 41.45 kg/(m^2).  Weight management plan: Patient was referred to their PCP to discuss a diet and exercise plan.        Boni Mehta DPM   Podiatric Foot & Ankle Surgeon  [facility]  [phone]       - Please call your insurance and find a Pulmonologist (lung doctor) covered by your insurance plan. It is important to see a Pulmonologist in order to have your shortness of breath evaluated. You will likely need a sleep study.

## 2022-02-13 ENCOUNTER — HEALTH MAINTENANCE LETTER (OUTPATIENT)
Age: 52
End: 2022-02-13

## 2022-02-16 ENCOUNTER — OFFICE VISIT (OUTPATIENT)
Dept: URGENT CARE | Facility: URGENT CARE | Age: 52
End: 2022-02-16
Payer: COMMERCIAL

## 2022-02-16 VITALS
WEIGHT: 315 LBS | BODY MASS INDEX: 42.22 KG/M2 | OXYGEN SATURATION: 96 % | SYSTOLIC BLOOD PRESSURE: 170 MMHG | RESPIRATION RATE: 18 BRPM | DIASTOLIC BLOOD PRESSURE: 90 MMHG | HEART RATE: 82 BPM | TEMPERATURE: 98.4 F

## 2022-02-16 DIAGNOSIS — H61.21 IMPACTED CERUMEN OF RIGHT EAR: Primary | ICD-10-CM

## 2022-02-16 PROCEDURE — 69209 REMOVE IMPACTED EAR WAX UNI: CPT | Mod: RT | Performed by: FAMILY MEDICINE

## 2022-02-16 NOTE — PROGRESS NOTES
ICD-10-CM    1. Impacted cerumen of right ear  H61.21        MA performed right ear lavage with good clearance of the impaction.  R TM appears WNL following the removal of cerumen.    Pt to f/u PRN for recheck if hearing decreases or the sensation of the ear being clogged returns.    SUBJECTIVE:  Jonathan Horn is a 51 year old male who presents to  with 3 days of decreased hearing in the R ear.  He thinks this is likely due to wax, which has happened on several prior occasions.  L ear feels fine.    OBJECTIVE:  BP (!) 170/90 (BP Location: Right arm, Patient Position: Chair, Cuff Size: Adult Large)   Pulse 82   Temp 98.4  F (36.9  C) (Oral)   Resp 18   Wt 147.1 kg (324 lb 6.4 oz)   SpO2 96%   BMI 42.22 kg/m    GEN: well-appearing, in NAD  ENT: L TM and canal WNL, TM on right side not visualized due to cerumen impaction

## 2022-02-23 ENCOUNTER — OFFICE VISIT (OUTPATIENT)
Dept: FAMILY MEDICINE | Facility: CLINIC | Age: 52
End: 2022-02-23
Payer: COMMERCIAL

## 2022-02-23 VITALS
OXYGEN SATURATION: 97 % | SYSTOLIC BLOOD PRESSURE: 144 MMHG | BODY MASS INDEX: 40.43 KG/M2 | HEIGHT: 74 IN | DIASTOLIC BLOOD PRESSURE: 78 MMHG | HEART RATE: 72 BPM | RESPIRATION RATE: 16 BRPM | WEIGHT: 315 LBS | TEMPERATURE: 98.5 F

## 2022-02-23 DIAGNOSIS — I10 ESSENTIAL HYPERTENSION: Primary | ICD-10-CM

## 2022-02-23 DIAGNOSIS — Z12.11 SCREEN FOR COLON CANCER: ICD-10-CM

## 2022-02-23 DIAGNOSIS — E66.01 MORBID OBESITY (H): ICD-10-CM

## 2022-02-23 DIAGNOSIS — Z11.59 NEED FOR HEPATITIS C SCREENING TEST: ICD-10-CM

## 2022-02-23 PROCEDURE — 99214 OFFICE O/P EST MOD 30 MIN: CPT | Mod: 25 | Performed by: FAMILY MEDICINE

## 2022-02-23 PROCEDURE — 91305 COVID-19,PF,PFIZER (12+ YRS): CPT | Performed by: FAMILY MEDICINE

## 2022-02-23 PROCEDURE — 90715 TDAP VACCINE 7 YRS/> IM: CPT | Performed by: FAMILY MEDICINE

## 2022-02-23 PROCEDURE — 36415 COLL VENOUS BLD VENIPUNCTURE: CPT | Performed by: FAMILY MEDICINE

## 2022-02-23 PROCEDURE — 0054A COVID-19,PF,PFIZER (12+ YRS): CPT | Performed by: FAMILY MEDICINE

## 2022-02-23 PROCEDURE — 80048 BASIC METABOLIC PNL TOTAL CA: CPT | Performed by: FAMILY MEDICINE

## 2022-02-23 PROCEDURE — 90471 IMMUNIZATION ADMIN: CPT | Performed by: FAMILY MEDICINE

## 2022-02-23 PROCEDURE — 86803 HEPATITIS C AB TEST: CPT | Performed by: FAMILY MEDICINE

## 2022-02-23 PROCEDURE — 80061 LIPID PANEL: CPT | Performed by: FAMILY MEDICINE

## 2022-02-23 RX ORDER — LISINOPRIL 40 MG/1
40 TABLET ORAL DAILY
Qty: 90 TABLET | Refills: 3 | Status: SHIPPED | OUTPATIENT
Start: 2022-02-23 | End: 2023-02-27

## 2022-02-23 RX ORDER — HYDROCHLOROTHIAZIDE 25 MG/1
25 TABLET ORAL DAILY
Qty: 90 TABLET | Refills: 3 | Status: SHIPPED | OUTPATIENT
Start: 2022-02-23 | End: 2023-02-27

## 2022-02-23 RX ORDER — AMLODIPINE BESYLATE 10 MG/1
10 TABLET ORAL DAILY
Qty: 90 TABLET | Refills: 3 | Status: SHIPPED | OUTPATIENT
Start: 2022-02-23 | End: 2023-02-27

## 2022-02-23 NOTE — PROGRESS NOTES
"  Assessment & Plan     Essential hypertension  Has been elevated recently, uncontrolled blood pressure.  Discussed increasing lisinopril to 40 mg daily.  Continue other medications.  Continue regular activity.  Consider sleep study.  Recheck in 1 month with nurse blood pressure visit.  - Basic metabolic panel  (Ca, Cl, CO2, Creat, Gluc, K, Na, BUN)  - lisinopril (ZESTRIL) 40 MG tablet; Take 1 tablet (40 mg) by mouth daily  - hydrochlorothiazide (HYDRODIURIL) 25 MG tablet; Take 1 tablet (25 mg) by mouth daily  - amLODIPine (NORVASC) 10 MG tablet; Take 1 tablet (10 mg) by mouth daily  - Lipid panel reflex to direct LDL Fasting    Morbid obesity (H)  Continue to work on diet and activity.    Need for hepatitis C screening test  - Hepatitis C Screen Reflex to HCV RNA Quant and Genotype    Screen for colon cancer  Family history, 5 year follow-up.  - Adult Gastro Ref - Procedure Only; Future             BMI:   Estimated body mass index is 41.6 kg/m  as calculated from the following:    Height as of this encounter: 1.88 m (6' 2\").    Weight as of this encounter: 147 kg (324 lb).   Weight management plan: Discussed healthy diet and exercise guidelines    Return in about 1 year (around 2/23/2023) for preventative care visit.    Joseph Rubio MD  [facility] SystemHUMA Calvert is a 51 year old who presents for the following health issues     History of Present Illness       Hypertension: He presents for follow up of hypertension.  He does check blood pressure  regularly outside of the clinic. Outside blood pressures have been over 140/90. He follows a low salt diet.     He eats 2-3 servings of fruits and vegetables daily.He consumes 4 sweetened beverage(s) daily.He exercises with enough effort to increase his heart rate 60 or more minutes per day.  He exercises with enough effort to increase his heart rate 5 days per week.   He is taking medications regularly.    History of hypertension taking " "lisinopril-hydrochlorothiazide and amlodipine.     History of GERD. Managed with Zantac as needed.    Review of Systems         Objective    BP (!) 144/78 (BP Location: Right arm, Patient Position: Left side, Cuff Size: Adult Large)   Pulse 72   Temp 98.5  F (36.9  C) (Oral)   Resp 16   Ht 1.88 m (6' 2\")   Wt 147 kg (324 lb)   SpO2 97%   BMI 41.60 kg/m    Body mass index is 41.6 kg/m .  Physical Exam   GENERAL: no distress and obese  RESP: lungs clear to auscultation - no rales, rhonchi or wheezes  CV: regular rate and rhythm, normal S1 S2, no S3 or S4, no murmur, click or rub, no peripheral edema and peripheral pulses strong                "

## 2022-02-24 ENCOUNTER — TELEPHONE (OUTPATIENT)
Dept: FAMILY MEDICINE | Facility: CLINIC | Age: 52
End: 2022-02-24
Payer: COMMERCIAL

## 2022-02-24 DIAGNOSIS — I10 ESSENTIAL HYPERTENSION: ICD-10-CM

## 2022-02-24 DIAGNOSIS — E87.6 HYPOKALEMIA: Primary | ICD-10-CM

## 2022-02-24 LAB
ANION GAP SERPL CALCULATED.3IONS-SCNC: 6 MMOL/L (ref 3–14)
BUN SERPL-MCNC: 14 MG/DL (ref 7–30)
CALCIUM SERPL-MCNC: 8.6 MG/DL (ref 8.5–10.1)
CHLORIDE BLD-SCNC: 100 MMOL/L (ref 94–109)
CHOLEST SERPL-MCNC: 188 MG/DL
CO2 SERPL-SCNC: 28 MMOL/L (ref 20–32)
CREAT SERPL-MCNC: 0.85 MG/DL (ref 0.66–1.25)
FASTING STATUS PATIENT QL REPORTED: NORMAL
GFR SERPL CREATININE-BSD FRML MDRD: >90 ML/MIN/1.73M2
GLUCOSE BLD-MCNC: 112 MG/DL (ref 70–99)
HDLC SERPL-MCNC: 70 MG/DL
LDLC SERPL CALC-MCNC: 96 MG/DL
NONHDLC SERPL-MCNC: 118 MG/DL
POTASSIUM BLD-SCNC: 3.3 MMOL/L (ref 3.4–5.3)
SODIUM SERPL-SCNC: 134 MMOL/L (ref 133–144)
TRIGL SERPL-MCNC: 111 MG/DL

## 2022-02-24 NOTE — TELEPHONE ENCOUNTER
Attempted call, no answer.    Potassium low - with increasing lisinopril (which can raise K) this may improve so would like to recheck in 1 month.    Schedule 1 month MA BP visit with lab-only for BMP.    BP Readings from Last 3 Encounters:   02/23/22 (!) 144/78   02/16/22 (!) 170/90   05/24/21 138/88

## 2022-02-25 LAB — HCV AB SERPL QL IA: NONREACTIVE

## 2022-03-01 ENCOUNTER — HOSPITAL ENCOUNTER (OUTPATIENT)
Facility: CLINIC | Age: 52
End: 2022-03-01
Attending: INTERNAL MEDICINE | Admitting: INTERNAL MEDICINE

## 2022-03-02 DIAGNOSIS — Z11.59 ENCOUNTER FOR SCREENING FOR OTHER VIRAL DISEASES: Primary | ICD-10-CM

## 2022-05-25 ENCOUNTER — ALLIED HEALTH/NURSE VISIT (OUTPATIENT)
Dept: FAMILY MEDICINE | Facility: CLINIC | Age: 52
End: 2022-05-25
Payer: COMMERCIAL

## 2022-05-25 VITALS — DIASTOLIC BLOOD PRESSURE: 82 MMHG | SYSTOLIC BLOOD PRESSURE: 138 MMHG

## 2022-05-25 DIAGNOSIS — I10 ESSENTIAL HYPERTENSION: Primary | ICD-10-CM

## 2022-05-25 PROCEDURE — 99207 PR NO CHARGE NURSE ONLY: CPT

## 2022-05-25 NOTE — PROGRESS NOTES
Jonathan Horn is a 52 year old patient who comes in today for a Blood Pressure check.  Initial BP:  /82      Disposition: follow-up as previously indicated by provider and results routed to provider  Michaela Ibanez CMA  [facility]

## 2022-10-16 ENCOUNTER — HEALTH MAINTENANCE LETTER (OUTPATIENT)
Age: 52
End: 2022-10-16

## 2023-02-27 DIAGNOSIS — I10 ESSENTIAL HYPERTENSION: ICD-10-CM

## 2023-02-27 RX ORDER — AMLODIPINE BESYLATE 10 MG/1
10 TABLET ORAL DAILY
Qty: 90 TABLET | Refills: 0 | Status: SHIPPED | OUTPATIENT
Start: 2023-02-27

## 2023-02-27 RX ORDER — LISINOPRIL 40 MG/1
40 TABLET ORAL DAILY
Qty: 90 TABLET | Refills: 0 | Status: SHIPPED | OUTPATIENT
Start: 2023-02-27

## 2023-02-27 RX ORDER — HYDROCHLOROTHIAZIDE 25 MG/1
25 TABLET ORAL DAILY
Qty: 90 TABLET | Refills: 0 | Status: SHIPPED | OUTPATIENT
Start: 2023-02-27

## 2023-02-27 NOTE — TELEPHONE ENCOUNTER
Previous patient of Joanne who is not longer with [facility]. Spoke with pt and he has not decided if he is going to follow Joanne to his new practice or establish care with this clinic.  Advised that a one time ricky refill will be requested but he will need to schedule with a new PCP prior to his next refill    Routing to DOD to advise on ricky refill     Angeli DAUGHERTY RN, BSN

## 2023-03-26 ENCOUNTER — HEALTH MAINTENANCE LETTER (OUTPATIENT)
Age: 53
End: 2023-03-26

## 2023-06-26 ENCOUNTER — HOSPITAL ENCOUNTER (EMERGENCY)
Facility: CLINIC | Age: 53
Discharge: HOME | End: 2023-06-26
Attending: EMERGENCY MEDICINE | Admitting: EMERGENCY MEDICINE
Payer: COMMERCIAL

## 2023-06-26 ENCOUNTER — APPOINTMENT (OUTPATIENT)
Dept: GENERAL RADIOLOGY | Facility: CLINIC | Age: 53
End: 2023-06-26
Attending: EMERGENCY MEDICINE
Payer: COMMERCIAL

## 2023-06-26 VITALS
RESPIRATION RATE: 16 BRPM | SYSTOLIC BLOOD PRESSURE: 137 MMHG | HEART RATE: 72 BPM | DIASTOLIC BLOOD PRESSURE: 80 MMHG | TEMPERATURE: 97.5 F | OXYGEN SATURATION: 98 %

## 2023-06-26 DIAGNOSIS — S42.032A CLOSED DISPLACED FRACTURE OF ACROMIAL END OF LEFT CLAVICLE, INITIAL ENCOUNTER: ICD-10-CM

## 2023-06-26 DIAGNOSIS — S20.212A CHEST WALL CONTUSION, LEFT, INITIAL ENCOUNTER: ICD-10-CM

## 2023-06-26 PROCEDURE — 250N000013 HC RX MED GY IP 250 OP 250 PS 637: Performed by: EMERGENCY MEDICINE

## 2023-06-26 PROCEDURE — 23500 CLTX CLAVICULAR FX W/O MNPJ: CPT | Mod: LT

## 2023-06-26 PROCEDURE — 73030 X-RAY EXAM OF SHOULDER: CPT | Mod: LT

## 2023-06-26 PROCEDURE — 99284 EMERGENCY DEPT VISIT MOD MDM: CPT | Mod: 25

## 2023-06-26 PROCEDURE — 71101 X-RAY EXAM UNILAT RIBS/CHEST: CPT | Mod: LT

## 2023-06-26 RX ORDER — IBUPROFEN 600 MG/1
600 TABLET, FILM COATED ORAL ONCE
Status: COMPLETED | OUTPATIENT
Start: 2023-06-26 | End: 2023-06-26

## 2023-06-26 RX ORDER — OXYCODONE HYDROCHLORIDE 5 MG/1
10 TABLET ORAL ONCE
Status: COMPLETED | OUTPATIENT
Start: 2023-06-26 | End: 2023-06-26

## 2023-06-26 RX ADMIN — IBUPROFEN 600 MG: 600 TABLET, FILM COATED ORAL at 03:36

## 2023-06-26 ASSESSMENT — ACTIVITIES OF DAILY LIVING (ADL)
ADLS_ACUITY_SCORE: 35
ADLS_ACUITY_SCORE: 35
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
## 2024-06-01 ENCOUNTER — HEALTH MAINTENANCE LETTER (OUTPATIENT)
Age: 54
End: 2024-06-01

## 2025-06-14 ENCOUNTER — HEALTH MAINTENANCE LETTER (OUTPATIENT)
Age: 55
End: 2025-06-14